# Patient Record
Sex: FEMALE | Race: WHITE | NOT HISPANIC OR LATINO | Employment: UNEMPLOYED | URBAN - NONMETROPOLITAN AREA
[De-identification: names, ages, dates, MRNs, and addresses within clinical notes are randomized per-mention and may not be internally consistent; named-entity substitution may affect disease eponyms.]

---

## 2020-02-24 ENCOUNTER — APPOINTMENT (EMERGENCY)
Dept: RADIOLOGY | Facility: HOSPITAL | Age: 75
DRG: 189 | End: 2020-02-24
Payer: MEDICARE

## 2020-02-24 ENCOUNTER — OFFICE VISIT (OUTPATIENT)
Dept: URGENT CARE | Facility: CLINIC | Age: 75
End: 2020-02-24
Payer: MEDICARE

## 2020-02-24 ENCOUNTER — HOSPITAL ENCOUNTER (INPATIENT)
Facility: HOSPITAL | Age: 75
LOS: 4 days | Discharge: LEFT BEFORE DISCHARGE | DRG: 189 | End: 2020-02-28
Attending: EMERGENCY MEDICINE | Admitting: FAMILY MEDICINE
Payer: MEDICARE

## 2020-02-24 VITALS
OXYGEN SATURATION: 83 % | RESPIRATION RATE: 30 BRPM | SYSTOLIC BLOOD PRESSURE: 191 MMHG | HEART RATE: 82 BPM | HEIGHT: 63 IN | BODY MASS INDEX: 51.91 KG/M2 | WEIGHT: 293 LBS | TEMPERATURE: 99.8 F | DIASTOLIC BLOOD PRESSURE: 86 MMHG

## 2020-02-24 DIAGNOSIS — R06.00 DYSPNEA, UNSPECIFIED TYPE: ICD-10-CM

## 2020-02-24 DIAGNOSIS — Z85.3 HISTORY OF BREAST CANCER: ICD-10-CM

## 2020-02-24 DIAGNOSIS — J06.9 VIRAL URI WITH COUGH: Primary | ICD-10-CM

## 2020-02-24 DIAGNOSIS — J06.9 VIRAL URI WITH COUGH: ICD-10-CM

## 2020-02-24 DIAGNOSIS — J18.9 PNEUMONIA OF RIGHT MIDDLE LOBE DUE TO INFECTIOUS ORGANISM: ICD-10-CM

## 2020-02-24 DIAGNOSIS — J10.1 INFLUENZA A: ICD-10-CM

## 2020-02-24 DIAGNOSIS — R09.02 HYPOXIA: ICD-10-CM

## 2020-02-24 DIAGNOSIS — J44.1 COPD EXACERBATION (HCC): Primary | ICD-10-CM

## 2020-02-24 PROBLEM — I10 ESSENTIAL HYPERTENSION: Status: ACTIVE | Noted: 2020-02-24

## 2020-02-24 PROBLEM — E11.65 TYPE 2 DIABETES MELLITUS WITH HYPERGLYCEMIA, WITHOUT LONG-TERM CURRENT USE OF INSULIN (HCC): Status: ACTIVE | Noted: 2020-02-24

## 2020-02-24 PROBLEM — E03.9 ACQUIRED HYPOTHYROIDISM: Status: ACTIVE | Noted: 2020-02-24

## 2020-02-24 PROBLEM — R91.8 PULMONARY INFILTRATE IN RIGHT LUNG ON CXR: Status: ACTIVE | Noted: 2020-02-24

## 2020-02-24 LAB
ALBUMIN SERPL BCP-MCNC: 3.1 G/DL (ref 3.5–5)
ALP SERPL-CCNC: 68 U/L (ref 46–116)
ALT SERPL W P-5'-P-CCNC: 26 U/L (ref 12–78)
ANION GAP SERPL CALCULATED.3IONS-SCNC: 3 MMOL/L (ref 4–13)
AST SERPL W P-5'-P-CCNC: 32 U/L (ref 5–45)
BASOPHILS # BLD AUTO: 0.01 THOUSANDS/ΜL (ref 0–0.1)
BASOPHILS NFR BLD AUTO: 0 % (ref 0–1)
BILIRUB SERPL-MCNC: 0.43 MG/DL (ref 0.2–1)
BUN SERPL-MCNC: 9 MG/DL (ref 5–25)
CALCIUM SERPL-MCNC: 8.2 MG/DL (ref 8.3–10.1)
CHLORIDE SERPL-SCNC: 102 MMOL/L (ref 100–108)
CO2 SERPL-SCNC: 35 MMOL/L (ref 21–32)
CREAT SERPL-MCNC: 0.67 MG/DL (ref 0.6–1.3)
EOSINOPHIL # BLD AUTO: 0.02 THOUSAND/ΜL (ref 0–0.61)
EOSINOPHIL NFR BLD AUTO: 1 % (ref 0–6)
ERYTHROCYTE [DISTWIDTH] IN BLOOD BY AUTOMATED COUNT: 13.5 % (ref 11.6–15.1)
FLUAV RNA NPH QL NAA+PROBE: DETECTED
FLUBV RNA NPH QL NAA+PROBE: ABNORMAL
GFR SERPL CREATININE-BSD FRML MDRD: 87 ML/MIN/1.73SQ M
GLUCOSE SERPL-MCNC: 247 MG/DL (ref 65–140)
HCT VFR BLD AUTO: 40.5 % (ref 34.8–46.1)
HGB BLD-MCNC: 13.2 G/DL (ref 11.5–15.4)
IMM GRANULOCYTES # BLD AUTO: 0.02 THOUSAND/UL (ref 0–0.2)
IMM GRANULOCYTES NFR BLD AUTO: 1 % (ref 0–2)
LYMPHOCYTES # BLD AUTO: 0.65 THOUSANDS/ΜL (ref 0.6–4.47)
LYMPHOCYTES NFR BLD AUTO: 17 % (ref 14–44)
MCH RBC QN AUTO: 32.3 PG (ref 26.8–34.3)
MCHC RBC AUTO-ENTMCNC: 32.6 G/DL (ref 31.4–37.4)
MCV RBC AUTO: 99 FL (ref 82–98)
MONOCYTES # BLD AUTO: 0.45 THOUSAND/ΜL (ref 0.17–1.22)
MONOCYTES NFR BLD AUTO: 12 % (ref 4–12)
NEUTROPHILS # BLD AUTO: 2.69 THOUSANDS/ΜL (ref 1.85–7.62)
NEUTS SEG NFR BLD AUTO: 69 % (ref 43–75)
NRBC BLD AUTO-RTO: 0 /100 WBCS
NT-PROBNP SERPL-MCNC: 392 PG/ML
PLATELET # BLD AUTO: 80 THOUSANDS/UL (ref 149–390)
PMV BLD AUTO: 9.2 FL (ref 8.9–12.7)
POTASSIUM SERPL-SCNC: 4 MMOL/L (ref 3.5–5.3)
PROT SERPL-MCNC: 7 G/DL (ref 6.4–8.2)
RBC # BLD AUTO: 4.09 MILLION/UL (ref 3.81–5.12)
RSV RNA NPH QL NAA+PROBE: ABNORMAL
SODIUM SERPL-SCNC: 140 MMOL/L (ref 136–145)
TROPONIN I SERPL-MCNC: <0.02 NG/ML
WBC # BLD AUTO: 3.84 THOUSAND/UL (ref 4.31–10.16)

## 2020-02-24 PROCEDURE — 71045 X-RAY EXAM CHEST 1 VIEW: CPT

## 2020-02-24 PROCEDURE — NC001 PR NO CHARGE: Performed by: NURSE PRACTITIONER

## 2020-02-24 PROCEDURE — 93005 ELECTROCARDIOGRAM TRACING: CPT

## 2020-02-24 PROCEDURE — G0463 HOSPITAL OUTPT CLINIC VISIT: HCPCS | Performed by: EMERGENCY MEDICINE

## 2020-02-24 PROCEDURE — 94760 N-INVAS EAR/PLS OXIMETRY 1: CPT

## 2020-02-24 PROCEDURE — 94660 CPAP INITIATION&MGMT: CPT

## 2020-02-24 PROCEDURE — 80053 COMPREHEN METABOLIC PANEL: CPT | Performed by: EMERGENCY MEDICINE

## 2020-02-24 PROCEDURE — 85025 COMPLETE CBC W/AUTO DIFF WBC: CPT | Performed by: EMERGENCY MEDICINE

## 2020-02-24 PROCEDURE — 96367 TX/PROPH/DG ADDL SEQ IV INF: CPT

## 2020-02-24 PROCEDURE — 96375 TX/PRO/DX INJ NEW DRUG ADDON: CPT

## 2020-02-24 PROCEDURE — 94640 AIRWAY INHALATION TREATMENT: CPT

## 2020-02-24 PROCEDURE — 99291 CRITICAL CARE FIRST HOUR: CPT | Performed by: EMERGENCY MEDICINE

## 2020-02-24 PROCEDURE — 87631 RESP VIRUS 3-5 TARGETS: CPT | Performed by: EMERGENCY MEDICINE

## 2020-02-24 PROCEDURE — 83880 ASSAY OF NATRIURETIC PEPTIDE: CPT | Performed by: EMERGENCY MEDICINE

## 2020-02-24 PROCEDURE — 36415 COLL VENOUS BLD VENIPUNCTURE: CPT | Performed by: EMERGENCY MEDICINE

## 2020-02-24 PROCEDURE — 99285 EMERGENCY DEPT VISIT HI MDM: CPT

## 2020-02-24 PROCEDURE — 96365 THER/PROPH/DIAG IV INF INIT: CPT

## 2020-02-24 PROCEDURE — 99223 1ST HOSP IP/OBS HIGH 75: CPT | Performed by: NURSE PRACTITIONER

## 2020-02-24 PROCEDURE — 84484 ASSAY OF TROPONIN QUANT: CPT | Performed by: EMERGENCY MEDICINE

## 2020-02-24 PROCEDURE — 99203 OFFICE O/P NEW LOW 30 MIN: CPT | Performed by: EMERGENCY MEDICINE

## 2020-02-24 RX ORDER — CELECOXIB 50 MG/1
CAPSULE ORAL
COMMUNITY
Start: 2019-12-04 | End: 2020-02-28 | Stop reason: HOSPADM

## 2020-02-24 RX ORDER — ALBUTEROL SULFATE 2.5 MG/3ML
2.5 SOLUTION RESPIRATORY (INHALATION) EVERY 4 HOURS PRN
Status: DISCONTINUED | OUTPATIENT
Start: 2020-02-24 | End: 2020-02-28 | Stop reason: HOSPADM

## 2020-02-24 RX ORDER — ASPIRIN 81 MG/1
81 TABLET ORAL DAILY
COMMUNITY

## 2020-02-24 RX ORDER — ALBUTEROL SULFATE 2.5 MG/3ML
2.5 SOLUTION RESPIRATORY (INHALATION) ONCE
Status: COMPLETED | OUTPATIENT
Start: 2020-02-24 | End: 2020-02-24

## 2020-02-24 RX ORDER — CEFTRIAXONE 1 G/50ML
1000 INJECTION, SOLUTION INTRAVENOUS EVERY 24 HOURS
Status: DISCONTINUED | OUTPATIENT
Start: 2020-02-25 | End: 2020-02-26

## 2020-02-24 RX ORDER — CELECOXIB 200 MG/1
CAPSULE ORAL
COMMUNITY
Start: 2020-01-07 | End: 2020-02-28 | Stop reason: HOSPADM

## 2020-02-24 RX ORDER — ATENOLOL 50 MG/1
TABLET ORAL
COMMUNITY
Start: 2020-01-07

## 2020-02-24 RX ORDER — LISINOPRIL 20 MG/1
TABLET ORAL
COMMUNITY
Start: 2020-02-20

## 2020-02-24 RX ORDER — METHYLPREDNISOLONE SODIUM SUCCINATE 125 MG/2ML
125 INJECTION, POWDER, LYOPHILIZED, FOR SOLUTION INTRAMUSCULAR; INTRAVENOUS ONCE
Status: COMPLETED | OUTPATIENT
Start: 2020-02-24 | End: 2020-02-24

## 2020-02-24 RX ORDER — GLIPIZIDE 5 MG/1
TABLET, FILM COATED, EXTENDED RELEASE ORAL
COMMUNITY
Start: 2020-02-20

## 2020-02-24 RX ORDER — OSELTAMIVIR PHOSPHATE 75 MG/1
75 CAPSULE ORAL ONCE
Status: COMPLETED | OUTPATIENT
Start: 2020-02-24 | End: 2020-02-24

## 2020-02-24 RX ORDER — GUAIFENESIN 600 MG
600 TABLET, EXTENDED RELEASE 12 HR ORAL 2 TIMES DAILY
Status: DISCONTINUED | OUTPATIENT
Start: 2020-02-25 | End: 2020-02-28 | Stop reason: HOSPADM

## 2020-02-24 RX ORDER — GLIPIZIDE 2.5 MG/1
TABLET, EXTENDED RELEASE ORAL
COMMUNITY
Start: 2020-02-20

## 2020-02-24 RX ORDER — ALBUTEROL SULFATE 2.5 MG/3ML
2.5 SOLUTION RESPIRATORY (INHALATION) ONCE
Status: DISCONTINUED | OUTPATIENT
Start: 2020-02-24 | End: 2020-02-24 | Stop reason: HOSPADM

## 2020-02-24 RX ORDER — KETOROLAC TROMETHAMINE 5 MG/ML
SOLUTION OPHTHALMIC
COMMUNITY
Start: 2020-02-20

## 2020-02-24 RX ORDER — ALBUTEROL SULFATE 90 UG/1
2 AEROSOL, METERED RESPIRATORY (INHALATION) EVERY 6 HOURS PRN
COMMUNITY
End: 2020-02-24

## 2020-02-24 RX ORDER — PREDNISOLONE ACETATE 10 MG/ML
SUSPENSION/ DROPS OPHTHALMIC
COMMUNITY
Start: 2020-02-22

## 2020-02-24 RX ORDER — OFLOXACIN 3 MG/ML
SOLUTION/ DROPS OPHTHALMIC
COMMUNITY
Start: 2020-02-22 | End: 2020-02-28 | Stop reason: HOSPADM

## 2020-02-24 RX ORDER — METHYLPREDNISOLONE SODIUM SUCCINATE 40 MG/ML
40 INJECTION, POWDER, LYOPHILIZED, FOR SOLUTION INTRAMUSCULAR; INTRAVENOUS EVERY 8 HOURS SCHEDULED
Status: DISCONTINUED | OUTPATIENT
Start: 2020-02-24 | End: 2020-02-27

## 2020-02-24 RX ORDER — LEVOTHYROXINE SODIUM 0.15 MG/1
TABLET ORAL
COMMUNITY
Start: 2020-02-20

## 2020-02-24 RX ORDER — CEFTRIAXONE 1 G/50ML
1000 INJECTION, SOLUTION INTRAVENOUS ONCE
Status: COMPLETED | OUTPATIENT
Start: 2020-02-24 | End: 2020-02-24

## 2020-02-24 RX ADMIN — CEFTRIAXONE 1000 MG: 1 INJECTION, SOLUTION INTRAVENOUS at 19:53

## 2020-02-24 RX ADMIN — AZITHROMYCIN MONOHYDRATE 500 MG: 500 INJECTION, POWDER, LYOPHILIZED, FOR SOLUTION INTRAVENOUS at 20:21

## 2020-02-24 RX ADMIN — ALBUTEROL SULFATE 2.5 MG: 2.5 SOLUTION RESPIRATORY (INHALATION) at 12:07

## 2020-02-24 RX ADMIN — Medication 0.5 MG: at 12:07

## 2020-02-24 RX ADMIN — ALBUTEROL SULFATE 2.5 MG: 2.5 SOLUTION RESPIRATORY (INHALATION) at 18:32

## 2020-02-24 RX ADMIN — ALBUTEROL SULFATE 2.5 MG: 2.5 SOLUTION RESPIRATORY (INHALATION) at 23:13

## 2020-02-24 RX ADMIN — METHYLPREDNISOLONE SODIUM SUCCINATE 125 MG: 125 INJECTION, POWDER, FOR SOLUTION INTRAMUSCULAR; INTRAVENOUS at 18:37

## 2020-02-24 RX ADMIN — OSELTAMIVIR PHOSPHATE 75 MG: 75 CAPSULE ORAL at 19:53

## 2020-02-24 NOTE — ED PROVIDER NOTES
History  Chief Complaint   Patient presents with    Shortness of Breath     pt states SOB started friday with fever friday and saturday  pt states she was at urgent care earlier today and dx with URI  pt denies cp  pt pulse ox 70 % on room air at time of triage     Patient is a 78-year-old female with a history of COPD who still smokes cigarettes, presenting to the emergency department complaining of 3-4 day history of shortness of breath with productive cough and fever, was seen at urgent care earlier today and diagnosed with viral URI, was encouraged to come to the emergency department for evaluation, initially patient resisted but as her shortness of breath continued she decided to come to the emergency department, she is noted to be 70% on room air at time of triage          Prior to Admission Medications   Prescriptions Last Dose Informant Patient Reported? Taking?    albuterol (5 mg/mL) 0 5 % nebulizer solution   Yes No   Sig: Take 2 5 mg by nebulization every 6 (six) hours as needed for wheezing   aspirin (ECOTRIN LOW STRENGTH) 81 mg EC tablet   Yes No   Sig: Take 81 mg by mouth daily   atenolol (TENORMIN) 50 mg tablet   Yes No   celecoxib (CeleBREX) 200 mg capsule   Yes No   celecoxib (CeleBREX) 50 MG capsule   Yes No   glipiZIDE (GLUCOTROL XL) 2 5 mg 24 hr tablet   Yes No   glipiZIDE (GLUCOTROL XL) 5 mg 24 hr tablet   Yes No   ketorolac (ACULAR) 0 5 % ophthalmic solution   Yes No   levothyroxine 150 mcg tablet   Yes No   lisinopril (ZESTRIL) 20 mg tablet   Yes No   nystatin-triamcinolone (MYCOLOG-II) cream   Yes No   ofloxacin (OCUFLOX) 0 3 % ophthalmic solution   Yes No   prednisoLONE acetate (PRED FORTE) 1 % ophthalmic suspension   Yes No      Facility-Administered Medications Last Administration Doses Remaining   albuterol inhalation solution 2 5 mg 2/24/2020 12:07 PM 0   ipratropium (ATROVENT) 0 02 % inhalation solution 0 5 mg 2/24/2020 12:07 PM 0          Past Medical History:   Diagnosis Date    Breast cancer ([facility] )     Diabetes mellitus ([facility] )     Hypertension        Past Surgical History:   Procedure Laterality Date    BREAST SURGERY      left lumpectomy    CARPAL TUNNEL RELEASE      CATARACT EXTRACTION      JOINT REPLACEMENT         History reviewed  No pertinent family history  I have reviewed and agree with the history as documented  E-Cigarette/Vaping    E-Cigarette Use Never User      E-Cigarette/Vaping Substances     Social History     Tobacco Use    Smoking status: Current Every Day Smoker     Packs/day: 0 25     Types: Cigarettes    Smokeless tobacco: Never Used   Substance Use Topics    Alcohol use: Never     Frequency: Never    Drug use: Never       Review of Systems   Constitutional: Positive for fever  HENT: Positive for congestion  Eyes: Negative  Respiratory: Positive for cough and shortness of breath  Cardiovascular: Negative  Gastrointestinal: Negative  Endocrine: Negative  Genitourinary: Negative  Musculoskeletal: Negative  Skin: Negative  Allergic/Immunologic: Negative  Neurological: Negative  Hematological: Negative  Psychiatric/Behavioral: Negative  Physical Exam  Physical Exam   Constitutional: She is oriented to person, place, and time  She appears well-developed  She appears ill  HENT:   Head: Normocephalic and atraumatic  Eyes: Pupils are equal, round, and reactive to light  EOM are normal    Neck: Normal range of motion  Cardiovascular: Normal rate  Pulmonary/Chest: Tachypnea noted  She has wheezes  She has rhonchi  Abdominal: Soft  Obese   Musculoskeletal: Normal range of motion  She exhibits edema  Neurological: She is alert and oriented to person, place, and time  Skin: Skin is warm and dry  Psychiatric: She has a normal mood and affect         Vital Signs  ED Triage Vitals [02/24/20 1817]   Temperature Pulse Respirations Blood Pressure SpO2   97 7 °F (36 5 °C) 72 (!) 30 (!) 197/89 (!) 89 %      Temp Source Heart Rate Source Patient Position - Orthostatic VS BP Location FiO2 (%)   Temporal Monitor Sitting Right arm --      Pain Score       No Pain           Vitals:    02/24/20 1817 02/24/20 2023 02/24/20 2100   BP: (!) 197/89 159/76 156/78   Pulse: 72 74 92   Patient Position - Orthostatic VS: Sitting Sitting Sitting               ED Medications  Medications   albuterol inhalation solution 2 5 mg (2 5 mg Nebulization Given 2/24/20 1832)   methylPREDNISolone sodium succinate (Solu-MEDROL) injection 125 mg (125 mg Intravenous Given 2/24/20 1837)   oseltamivir (TAMIFLU) capsule 75 mg (75 mg Oral Given 2/24/20 1953)   cefTRIAXone (ROCEPHIN) IVPB (premix) 1,000 mg (0 mg Intravenous Stopped 2/24/20 2023)   azithromycin (ZITHROMAX) 500 mg in sodium chloride 0 9 % 250 mL IVPB (0 mg Intravenous Stopped 2/24/20 2124)       Diagnostic Studies  Results Reviewed     Procedure Component Value Units Date/Time    CBC and differential [[id number]]  (Abnormal) Collected:  02/24/20 1835    Lab Status:  Final result Specimen:  Blood from Arm, Right Updated:  02/24/20 1921     WBC 3 84 Thousand/uL      RBC 4 09 Million/uL      Hemoglobin 13 2 g/dL      Hematocrit 40 5 %      MCV 99 fL      MCH 32 3 pg      MCHC 32 6 g/dL      RDW 13 5 %      MPV 9 2 fL      Platelets 80 Thousands/uL      nRBC 0 /100 WBCs      Neutrophils Relative 69 %      Immat GRANS % 1 %      Lymphocytes Relative 17 %      Monocytes Relative 12 %      Eosinophils Relative 1 %      Basophils Relative 0 %      Neutrophils Absolute 2 69 Thousands/µL      Immature Grans Absolute 0 02 Thousand/uL      Lymphocytes Absolute 0 65 Thousands/µL      Monocytes Absolute 0 45 Thousand/µL      Eosinophils Absolute 0 02 Thousand/µL      Basophils Absolute 0 01 Thousands/µL     Influenza A/B and RSV PCR [[id number]]  (Abnormal) Collected:  02/24/20 1835    Lab Status:  Final result Specimen:  Nose Updated:  02/24/20 1918     INFLUENZA A PCR Detected     INFLUENZA B PCR None Detected     RSV PCR None Detected    Comprehensive metabolic panel [[id number]]  (Abnormal) Collected:  02/24/20 1835    Lab Status:  Final result Specimen:  Blood from Arm, Right Updated:  02/24/20 1902     Sodium 140 mmol/L      Potassium 4 0 mmol/L      Chloride 102 mmol/L      CO2 35 mmol/L      ANION GAP 3 mmol/L      BUN 9 mg/dL      Creatinine 0 67 mg/dL      Glucose 247 mg/dL      Calcium 8 2 mg/dL      AST 32 U/L      ALT 26 U/L      Alkaline Phosphatase 68 U/L      Total Protein 7 0 g/dL      Albumin 3 1 g/dL      Total Bilirubin 0 43 mg/dL      eGFR 87 ml/min/1 73sq m     Narrative:       [place] guidelines for Chronic Kidney Disease (CKD):     Stage 1 with normal or high GFR (GFR > 90 mL/min/1 73 square meters)    Stage 2 Mild CKD (GFR = 60-89 mL/min/1 73 square meters)    Stage 3A Moderate CKD (GFR = 45-59 mL/min/1 73 square meters)    Stage 3B Moderate CKD (GFR = 30-44 mL/min/1 73 square meters)    Stage 4 Severe CKD (GFR = 15-29 mL/min/1 73 square meters)    Stage 5 End Stage CKD (GFR <15 mL/min/1 73 square meters)  Note: GFR calculation is accurate only with a steady state creatinine    NT-BNP PRO [[id number]]  (Abnormal) Collected:  02/24/20 1835    Lab Status:  Final result Specimen:  Blood from Arm, Right Updated:  02/24/20 1902     NT-proBNP 392 pg/mL     Troponin I [[id number]]  (Normal) Collected:  02/24/20 1835    Lab Status:  Final result Specimen:  Blood from Arm, Right Updated:  02/24/20 1859     Troponin I <0 02 ng/mL                  XR chest 1 view portable   ED Interpretation by Carlene Mario DO (02/24 1851)   Right perihilar consolidation                 Procedures  ECG 12 Lead Documentation Only  Date/Time: 2/24/2020 6:56 PM  Performed by: Carlene Mario DO  Authorized by: Carlene Mario DO     Indications / Diagnosis:  Shortness of breath  ECG reviewed by me, the ED Provider: yes    Patient location:  ED  Previous ECG:     Comparison to cardiac monitor: Yes    Interpretation:     Interpretation: normal    Rate:     ECG rate:  71    ECG rate assessment: normal    Rhythm:     Rhythm: sinus rhythm    Ectopy:     Ectopy: none    QRS:     QRS axis:  Normal    QRS intervals:  Normal  Conduction:     Conduction: normal    ST segments:     ST segments:  Normal  T waves:     T waves: normal      CriticalCare Time  Performed by: Prabha Valdes DO  Authorized by: Prabha Valdes DO     Critical care provider statement:     Critical care time (minutes):  40    Critical care was time spent personally by me on the following activities:  Obtaining history from patient or surrogate, development of treatment plan with patient or surrogate, evaluation of patient's response to treatment, examination of patient, ordering and performing treatments and interventions, ordering and review of laboratory studies, ordering and review of radiographic studies, re-evaluation of patient's condition and review of old charts    I assumed direction of critical care for this patient from another provider in my specialty: no               ED Course  ED Course as of Feb 24 2157   Mon Feb 24, 2020 2155 Lab and imaging findings consistent with influenza a, with right perihilar infiltrate, patient initially hypoxic on arrival, responded well to 2 L nasal cannula and albuterol treatments, she does get slightly hypoxic and short of breath when she attempts to ambulated all, therefore patient agreeable to admission to the hospital for further evaluation and treatment, discussed with hospitalist service who is in agreement as well                                      Disposition  Final diagnoses:   COPD exacerbation ([facility] )   Hypoxia   Pneumonia of right middle lobe due to infectious organism [facility])   Influenza A     Time reflects when diagnosis was documented in both MDM as applicable and the Disposition within this note     Time User Action Codes Description Comment    2/24/2020  8:52 PM Lesta American Add [J44 1] COPD exacerbation ([facility] )     2/24/2020  8:52 PM Lesta American Add [R09 02] Hypoxia     2/24/2020  8:52 PM JasminGaetano garcia Add [J18 1] Pneumonia of right middle lobe due to infectious organism ([facility] )     2/24/2020  8:52 PM Gaetano Redd Add [J10 1] Influenza A       ED Disposition     ED Disposition Condition Date/Time Comment    Admit Stable Mon Feb 24, 2020  8:52 PM Case was discussed with NP Radha Anaya and the patient's admission status was agreed to be Admission Status: inpatient status to the service of Dr Kathie Gunn          Follow-up Information    None         Current Discharge Medication List      CONTINUE these medications which have NOT CHANGED    Details   albuterol (5 mg/mL) 0 5 % nebulizer solution Take 2 5 mg by nebulization every 6 (six) hours as needed for wheezing      aspirin (ECOTRIN LOW STRENGTH) 81 mg EC tablet Take 81 mg by mouth daily      atenolol (TENORMIN) 50 mg tablet       !! celecoxib (CeleBREX) 200 mg capsule       !! celecoxib (CeleBREX) 50 MG capsule       !! glipiZIDE (GLUCOTROL XL) 2 5 mg 24 hr tablet       !! glipiZIDE (GLUCOTROL XL) 5 mg 24 hr tablet       ketorolac (ACULAR) 0 5 % ophthalmic solution       levothyroxine 150 mcg tablet       lisinopril (ZESTRIL) 20 mg tablet       nystatin-triamcinolone (MYCOLOG-II) cream       ofloxacin (OCUFLOX) 0 3 % ophthalmic solution       prednisoLONE acetate (PRED FORTE) 1 % ophthalmic suspension        !! - Potential duplicate medications found  Please discuss with provider  No discharge procedures on file      PDMP Review     None          ED Provider  Electronically Signed by           Maureen Martinez DO  02/24/20 0415

## 2020-02-24 NOTE — PROGRESS NOTES
[id number]        NAME: Ok Gomez is a 69 y o  female  : 1945    MRN: [id number]  DATE: 2020  TIME: 12:49 PM    Assessment and Plan   Viral URI with cough [J06 9, B97 [id number]]  1  Viral URI with cough  albuterol inhalation solution 2 5 mg    ipratropium (ATROVENT) 0 02 % inhalation solution 0 5 mg    Peak flow    Ambulatory Referral to Emergency Medicine    Transfer to other facility   2  Dyspnea, unspecified type  Ambulatory Referral to Emergency Medicine    Transfer to other facility     Pre treatment peak flow was 140  Post treatment peak flow is 100  Pulse ox post treatment remains at 83%  I advised patient that she would need to go to the ER for further evaluation to likely include further neb treatments and steroids  I advised her that she should go to the ER by ambulance  Patient absolutely refuses to go to ER as he claims she felt better after neb treatment  She has nebulizer medicine at the pharmacy that have been called in earlier today by her primary care doctor  She states that she plans to  the medicine and use the neb treatments as prescribed and if she does not respond then she will agree to go to the [facility] ER  Patient Instructions     Patient Instructions     You have been diagnosed with a Viral Upper Respiratory infection and your symptoms should resolve over the next 7 to 10 days with the treatments recommended today  If they do not, it is possible that you have developed a bacterial infection and you should return  If you were to take an antibiotic while you are still in the viral stage, you will not get better any faster, but could kill off the good germs in your body as well as make the germs in you resistant to the antibiotic  Take an expectorant - guaifenesin should be the only ingredient - during the day, and the cough suppressant (ex  Robitussin DM or Tessalon) if needed at night only   Take Zinc 12 5 to 15 mg every 2 - 3 hrs while awake for the next few days  You may take Cold Alessandro (13 3 mg of Zinc) or split a 25 mg Zinc tablet or lozenge in two or a 50 mg into four to get the proper dose  The total daily dose of Zinc should exceed 75 mg per day  You may also take a decongestant like Sudafed, unless you have hypertension or cardiac disease  Hold any NSAIDs like Ibuprofen (Advil), Naprosyn (Aleve), etc while on steroids like Medrol or Prednisone  If you are diabetic, you should also adhere strictly to your diet and monitor your blood sugar closely while on the steroids as discussed  Discontinue the steroid immediately if your blood sugar gets out of control  Upper Respiratory Infection   AMBULATORY CARE:   An upper respiratory infection  is also called a common cold  It can affect your nose, throat, ears, and sinuses  Common signs and symptoms include the following:  Cold symptoms are usually worst for the first 3 to 5 days  You may have any of the following:  · Runny or stuffy nose    · Sneezing and coughing    · Sore throat or hoarseness    · Red, watery, and sore eyes    · Fatigue     · Chills and fever    · Headache, body aches, or sore muscles  Seek care immediately if:   · You have chest pain or trouble breathing  Contact your healthcare provider if:   · You have a fever over 102ºF (39°C)  · Your sore throat gets worse or you see white or yellow spots in your throat  · Your symptoms get worse after 3 to 5 days or your cold is not better in 14 days  · You have a rash anywhere on your skin  · You have large, tender lumps in your neck  · You have thick, green or yellow drainage from your nose  · You cough up thick yellow, green, or bloody mucus  · You have vomiting for more than 24 hours and cannot keep fluids down  · You have a bad earache  · You have questions or concerns about your condition or care  Treatment for a cold: There is no cure for the common cold   Colds are caused by viruses and do not get better with antibiotics  Most people get better in 7 to 14 days  You may continue to cough for 2 to 3 weeks  The following may help decrease your symptoms:  · Decongestants  help reduce nasal congestion and help you breathe more easily  If you take decongestant pills, they may make you feel restless or not able to sleep  Do not use decongestant sprays for more than a few days  · Cough suppressants  help reduce coughing  Ask your healthcare provider which type of cough medicine is best for you  · NSAIDs , such as ibuprofen, help decrease swelling, pain, and fever  NSAIDs can cause stomach bleeding or kidney problems in certain people  If you take blood thinner medicine, always ask your healthcare provider if NSAIDs are safe for you  Always read the medicine label and follow directions  · Acetaminophen  decreases pain and fever  It is available without a doctor's order  Ask how much to take and how often to take it  Follow directions  Read the labels of all other medicines you are using to see if they also contain acetaminophen, or ask your doctor or pharmacist  Acetaminophen can cause liver damage if not taken correctly  Do not use more than 4 grams (4,000 milligrams) total of acetaminophen in one day  Manage your cold:   · Rest as much as possible  Slowly start to do more each day  · Drink more liquids as directed  Liquids will help thin and loosen mucus so you can cough it up  Liquids will also help prevent dehydration  Liquids that help prevent dehydration include water, fruit juice, and broth  Do not drink liquids that contain caffeine  Caffeine can increase your risk for dehydration  Ask your healthcare provider how much liquid to drink each day  · Soothe a sore throat  Gargle with warm salt water  This helps your sore throat feel better  Make salt water by dissolving ¼ teaspoon salt in 1 cup warm water  You may also suck on hard candy or throat lozenges   You may use a sore throat spray  · Use a humidifier or vaporizer  Use a cool mist humidifier or a vaporizer to increase air moisture in your home  This may make it easier for you to breathe and help decrease your cough  · Use saline nasal drops as directed  These help relieve congestion  · Apply petroleum-based jelly around the outside of your nostrils  This can decrease irritation from blowing your nose  · Do not smoke  Nicotine and other chemicals in cigarettes and cigars can make your symptoms worse  They can also cause infections such as bronchitis or pneumonia  Ask your healthcare provider for information if you currently smoke and need help to quit  E-cigarettes or smokeless tobacco still contain nicotine  Talk to your healthcare provider before you use these products  Prevent spreading your cold to others:   · Try to stay away from other people during the first 2 to 3 days of your cold when it is more easily spread  · Do not share food or drinks  · Do not share hand towels with household members  · Wash your hands often, especially after you blow your nose  Turn away from other people and cover your mouth and nose with a tissue when you sneeze or cough  Follow up with your healthcare provider as directed:  Write down your questions so you remember to ask them during your visits  © 2017 [facility] Information is for End User's use only and may not be sold, redistributed or otherwise used for commercial purposes  All illustrations and images included in CareNotes® are the copyrighted property of [employer] , [employer]  or Omid Eldridge  The above information is an  only  It is not intended as medical advice for individual conditions or treatments  Talk to your doctor, nurse or pharmacist before following any medical regimen to see if it is safe and effective for you  Dyspnea   WHAT YOU NEED TO KNOW:   Dyspnea is breathing difficulty or discomfort  You may have labored, painful, or shallow breathing  You may feel breathless or short of breath  Dyspnea can occur during rest or with activity  You may have dyspnea for a short time, or it might become chronic  Dyspnea is often a symptom of a disease or condition  DISCHARGE INSTRUCTIONS:   Return to the emergency department if:   · Your signs and symptoms are the same or worse within 24 hours of treatment  · You have shaking chills or a fever over 102°F      · You have new pain, pressure, or tightness in your chest      · You have a new or worse cough or wheezing, or you cough up blood  · You feel like you cannot get enough air  · The skin over your ribs or on your neck sinks in when you breathe  · You have a severe headache with vomiting and abdominal pain  · You feel confused or dizzy  Contact your healthcare provider or specialist if:   · You have questions or concerns about your condition or care  Medicines:   · Medicines  may be used to treat the cause of your dyspnea  Medicines may reduce swelling in your airway or decrease extra fluid from around your heart or lungs  Other medicines may be used to decrease anxiety and help you feel calm and relaxed  · Take your medicine as directed  Contact your healthcare provider if you think your medicine is not helping or if you have side effects  Tell him or her if you are allergic to any medicine  Keep a list of the medicines, vitamins, and herbs you take  Include the amounts, and when and why you take them  Bring the list or the pill bottles to follow-up visits  Carry your medicine list with you in case of an emergency  Manage long-term dyspnea:   · Create an action plan  You and your healthcare provider can work together to create a plan for how to handle episodes of dyspnea  The plan can include daily activities, treatment changes, and what to do if you have severe breathing problems  · Lean forward on your elbows when you sit    This helps your lungs expand and may make it easier to breathe  · Use pursed-lip breathing any time you feel short of breath  Breathe in through your nose and then slowly breathe out through your mouth with your lips slightly puckered  It should take you twice as long to breathe out as it did to breathe in  · Do not smoke  Nicotine and other chemicals in cigarettes and cigars can cause lung damage and make it harder to breathe  Ask your healthcare provider for information if you currently smoke and need help to quit  E-cigarettes or smokeless tobacco still contain nicotine  Talk to your healthcare provider before you use these products  · Reach or maintain a healthy weight  Your healthcare provider can help you create a safe weight loss plan if you are overweight  · Exercise as directed  Exercise can help your lungs work more easily  Exercise can also help you lose weight if needed  Try to get at least 30 minutes of exercise most days of the week  Your healthcare provider can help you create an exercise plan that is safe for you  Follow up with your healthcare provider or specialist as directed:  Write down your questions so you remember to ask them during your visits  © 2017 [address] [facility] Information is for End User's use only and may not be sold, redistributed or otherwise used for commercial purposes  All illustrations and images included in CareNotes® are the copyrighted property of [employer]  or [employer]mid Eldridge  The above information is an  only  It is not intended as medical advice for individual conditions or treatments  Talk to your doctor, nurse or pharmacist before following any medical regimen to see if it is safe and effective for you  Follow up with PCP in 3-5 days  Proceed to  ER if symptoms worsen      Chief Complaint     Chief Complaint   Patient presents with    Shortness of Breath     pt reports cough and fever that started Friday  Fever resolved but pt still coughing and very short of breath  Pt had run out of Albuterol, did call PCP for refill that she will  today  Pt noted to have purse-lipped breathing  History of Present Illness       Patient complains of cough, congestion and chest tightness for the past 3 days  She had fever initially but none today  She has a nebulizer at home but ran out of albuterol nebulizer solution 3 days ago  Review of Systems   Review of Systems   Constitutional: Negative for chills and fever  HENT: Positive for congestion, rhinorrhea, sinus pressure and sore throat  Negative for trouble swallowing and voice change  Respiratory: Positive for cough and chest tightness  Negative for shortness of breath and wheezing  Cardiovascular: Negative for chest pain  Current Medications       Current Outpatient Medications:     albuterol (5 mg/mL) 0 5 % nebulizer solution, Take 2 5 mg by nebulization every 6 (six) hours as needed for wheezing, Disp: , Rfl:     aspirin (ECOTRIN LOW STRENGTH) 81 mg EC tablet, Take 81 mg by mouth daily, Disp: , Rfl:     atenolol (TENORMIN) 50 mg tablet, , Disp: , Rfl:     celecoxib (CeleBREX) 200 mg capsule, , Disp: , Rfl:     celecoxib (CeleBREX) 50 MG capsule, , Disp: , Rfl:     glipiZIDE (GLUCOTROL XL) 2 5 mg 24 hr tablet, , Disp: , Rfl:     glipiZIDE (GLUCOTROL XL) 5 mg 24 hr tablet, , Disp: , Rfl:     ketorolac (ACULAR) 0 5 % ophthalmic solution, , Disp: , Rfl:     levothyroxine 150 mcg tablet, , Disp: , Rfl:     lisinopril (ZESTRIL) 20 mg tablet, , Disp: , Rfl:     nystatin-triamcinolone (MYCOLOG-II) cream, , Disp: , Rfl:     ofloxacin (OCUFLOX) 0 3 % ophthalmic solution, , Disp: , Rfl:     prednisoLONE acetate (PRED FORTE) 1 % ophthalmic suspension, , Disp: , Rfl:   No current facility-administered medications for this visit       Current Allergies     Allergies as of 02/24/2020 - Reviewed 02/24/2020   Allergen Reaction Noted  Adhesive [medical tape]  02/24/2020    Biaxin [clarithromycin]  02/24/2020    Citrus  02/24/2020            The following portions of the patient's history were reviewed and updated as appropriate: allergies, current medications, past family history, past medical history, past social history, past surgical history and problem list      Past Medical History:   Diagnosis Date    Diabetes mellitus ([facility] [address] )     Hypertension        Past Surgical History:   Procedure Laterality Date    BREAST SURGERY      left lumpectomy    CARPAL TUNNEL RELEASE      CATARACT EXTRACTION         History reviewed  No pertinent family history  Medications have been verified  Objective   BP (!) 191/86   Pulse 82   Temp 99 8 °F (37 7 °C)   Resp (!) 30   Ht 5' 3" (1 6 m)   Wt 134 kg (296 lb)   SpO2 (!) 83%    L/min   BMI 52 43 kg/m²        Physical Exam     Physical Exam   Constitutional: She is oriented to person, place, and time  She appears well-developed and well-nourished  No distress  HENT:   Head: Normocephalic and atraumatic  Right Ear: Tympanic membrane and external ear normal    Left Ear: Tympanic membrane and external ear normal    Nose: Mucosal edema present  Mouth/Throat: Posterior oropharyngeal erythema present  No oropharyngeal exudate or tonsillar abscesses  Nasal mucosa congested, oropharynx mildly erythematous  Neck: Neck supple  Cardiovascular: Normal rate and regular rhythm  Pulmonary/Chest: Effort normal  No respiratory distress  She has no wheezes  She has no rales  Neurological: She is alert and oriented to person, place, and time  Skin: Skin is warm and dry  Psychiatric: She has a normal mood and affect  Her behavior is normal  Judgment and thought content normal    Nursing note and vitals reviewed

## 2020-02-24 NOTE — PATIENT INSTRUCTIONS
You have been diagnosed with a Viral Upper Respiratory infection and your symptoms should resolve over the next 7 to 10 days with the treatments recommended today  If they do not, it is possible that you have developed a bacterial infection and you should return  If you were to take an antibiotic while you are still in the viral stage, you will not get better any faster, but could kill off the good germs in your body as well as make the germs in you resistant to the antibiotic  Take an expectorant - guaifenesin should be the only ingredient - during the day, and the cough suppressant (ex  Robitussin DM or Tessalon) if needed at night only  Take Zinc 12 5 to 15 mg every 2 - 3 hrs while awake for the next few days  You may take Cold Alessandro (13 3 mg of Zinc) or split a 25 mg Zinc tablet or lozenge in two or a 50 mg into four to get the proper dose  The total daily dose of Zinc should exceed 75 mg per day  You may also take a decongestant like Sudafed, unless you have hypertension or cardiac disease  Hold any NSAIDs like Ibuprofen (Advil), Naprosyn (Aleve), etc while on steroids like Medrol or Prednisone  If you are diabetic, you should also adhere strictly to your diet and monitor your blood sugar closely while on the steroids as discussed  Discontinue the steroid immediately if your blood sugar gets out of control  Upper Respiratory Infection   AMBULATORY CARE:   An upper respiratory infection  is also called a common cold  It can affect your nose, throat, ears, and sinuses  Common signs and symptoms include the following:  Cold symptoms are usually worst for the first 3 to 5 days  You may have any of the following:  · Runny or stuffy nose    · Sneezing and coughing    · Sore throat or hoarseness    · Red, watery, and sore eyes    · Fatigue     · Chills and fever    · Headache, body aches, or sore muscles  Seek care immediately if:   · You have chest pain or trouble breathing      Contact your healthcare provider if:   · You have a fever over 102ºF (39°C)  · Your sore throat gets worse or you see white or yellow spots in your throat  · Your symptoms get worse after 3 to 5 days or your cold is not better in 14 days  · You have a rash anywhere on your skin  · You have large, tender lumps in your neck  · You have thick, green or yellow drainage from your nose  · You cough up thick yellow, green, or bloody mucus  · You have vomiting for more than 24 hours and cannot keep fluids down  · You have a bad earache  · You have questions or concerns about your condition or care  Treatment for a cold: There is no cure for the common cold  Colds are caused by viruses and do not get better with antibiotics  Most people get better in 7 to 14 days  You may continue to cough for 2 to 3 weeks  The following may help decrease your symptoms:  · Decongestants  help reduce nasal congestion and help you breathe more easily  If you take decongestant pills, they may make you feel restless or not able to sleep  Do not use decongestant sprays for more than a few days  · Cough suppressants  help reduce coughing  Ask your healthcare provider which type of cough medicine is best for you  · NSAIDs , such as ibuprofen, help decrease swelling, pain, and fever  NSAIDs can cause stomach bleeding or kidney problems in certain people  If you take blood thinner medicine, always ask your healthcare provider if NSAIDs are safe for you  Always read the medicine label and follow directions  · Acetaminophen  decreases pain and fever  It is available without a doctor's order  Ask how much to take and how often to take it  Follow directions  Read the labels of all other medicines you are using to see if they also contain acetaminophen, or ask your doctor or pharmacist  Acetaminophen can cause liver damage if not taken correctly  Do not use more than 4 grams (4,000 milligrams) total of acetaminophen in one day    Manage your cold:   · Rest as much as possible  Slowly start to do more each day  · Drink more liquids as directed  Liquids will help thin and loosen mucus so you can cough it up  Liquids will also help prevent dehydration  Liquids that help prevent dehydration include water, fruit juice, and broth  Do not drink liquids that contain caffeine  Caffeine can increase your risk for dehydration  Ask your healthcare provider how much liquid to drink each day  · Soothe a sore throat  Gargle with warm salt water  This helps your sore throat feel better  Make salt water by dissolving ¼ teaspoon salt in 1 cup warm water  You may also suck on hard candy or throat lozenges  You may use a sore throat spray  · Use a humidifier or vaporizer  Use a cool mist humidifier or a vaporizer to increase air moisture in your home  This may make it easier for you to breathe and help decrease your cough  · Use saline nasal drops as directed  These help relieve congestion  · Apply petroleum-based jelly around the outside of your nostrils  This can decrease irritation from blowing your nose  · Do not smoke  Nicotine and other chemicals in cigarettes and cigars can make your symptoms worse  They can also cause infections such as bronchitis or pneumonia  Ask your healthcare provider for information if you currently smoke and need help to quit  E-cigarettes or smokeless tobacco still contain nicotine  Talk to your healthcare provider before you use these products  Prevent spreading your cold to others:   · Try to stay away from other people during the first 2 to 3 days of your cold when it is more easily spread  · Do not share food or drinks  · Do not share hand towels with household members  · Wash your hands often, especially after you blow your nose  Turn away from other people and cover your mouth and nose with a tissue when you sneeze or cough         Follow up with your healthcare provider as directed:  Write down your questions so you remember to ask them during your visits  © 2017 [employer] Information is for End User's use only and may not be sold, redistributed or otherwise used for commercial purposes  All illustrations and images included in CareNotes® are the copyrighted property of [employer]  or [employer]  The above information is an  only  It is not intended as medical advice for individual conditions or treatments  Talk to your doctor, nurse or pharmacist before following any medical regimen to see if it is safe and effective for you  Dyspnea   WHAT YOU NEED TO KNOW:   Dyspnea is breathing difficulty or discomfort  You may have labored, painful, or shallow breathing  You may feel breathless or short of breath  Dyspnea can occur during rest or with activity  You may have dyspnea for a short time, or it might become chronic  Dyspnea is often a symptom of a disease or condition  DISCHARGE INSTRUCTIONS:   Return to the emergency department if:   · Your signs and symptoms are the same or worse within 24 hours of treatment  · You have shaking chills or a fever over 102°F      · You have new pain, pressure, or tightness in your chest      · You have a new or worse cough or wheezing, or you cough up blood  · You feel like you cannot get enough air  · The skin over your ribs or on your neck sinks in when you breathe  · You have a severe headache with vomiting and abdominal pain  · You feel confused or dizzy  Contact your healthcare provider or specialist if:   · You have questions or concerns about your condition or care  Medicines:   · Medicines  may be used to treat the cause of your dyspnea  Medicines may reduce swelling in your airway or decrease extra fluid from around your heart or lungs  Other medicines may be used to decrease anxiety and help you feel calm and relaxed  · Take your medicine as directed    Contact your healthcare provider if you think your medicine is not helping or if you have side effects  Tell him or her if you are allergic to any medicine  Keep a list of the medicines, vitamins, and herbs you take  Include the amounts, and when and why you take them  Bring the list or the pill bottles to follow-up visits  Carry your medicine list with you in case of an emergency  Manage long-term dyspnea:   · Create an action plan  You and your healthcare provider can work together to create a plan for how to handle episodes of dyspnea  The plan can include daily activities, treatment changes, and what to do if you have severe breathing problems  · Lean forward on your elbows when you sit  This helps your lungs expand and may make it easier to breathe  · Use pursed-lip breathing any time you feel short of breath  Breathe in through your nose and then slowly breathe out through your mouth with your lips slightly puckered  It should take you twice as long to breathe out as it did to breathe in  · Do not smoke  Nicotine and other chemicals in cigarettes and cigars can cause lung damage and make it harder to breathe  Ask your healthcare provider for information if you currently smoke and need help to quit  E-cigarettes or smokeless tobacco still contain nicotine  Talk to your healthcare provider before you use these products  · Reach or maintain a healthy weight  Your healthcare provider can help you create a safe weight loss plan if you are overweight  · Exercise as directed  Exercise can help your lungs work more easily  Exercise can also help you lose weight if needed  Try to get at least 30 minutes of exercise most days of the week  Your healthcare provider can help you create an exercise plan that is safe for you  Follow up with your healthcare provider or specialist as directed:  Write down your questions so you remember to ask them during your visits    © 2017 [employer] Information is for End User's use only and may not be sold, redistributed or otherwise used for commercial purposes  All illustrations and images included in CareNotes® are the copyrighted property of [employer]  or [employer]  The above information is an  only  It is not intended as medical advice for individual conditions or treatments  Talk to your doctor, nurse or pharmacist before following any medical regimen to see if it is safe and effective for you

## 2020-02-25 PROBLEM — J44.1 COPD EXACERBATION (HCC): Status: ACTIVE | Noted: 2020-02-25

## 2020-02-25 PROBLEM — D69.6 THROMBOCYTOPENIA (HCC): Status: ACTIVE | Noted: 2020-02-25

## 2020-02-25 LAB
ANION GAP SERPL CALCULATED.3IONS-SCNC: 2 MMOL/L (ref 4–13)
BASOPHILS # BLD AUTO: 0.01 THOUSANDS/ΜL (ref 0–0.1)
BASOPHILS NFR BLD AUTO: 0 % (ref 0–1)
BUN SERPL-MCNC: 14 MG/DL (ref 5–25)
CALCIUM SERPL-MCNC: 8.2 MG/DL (ref 8.3–10.1)
CHLORIDE SERPL-SCNC: 104 MMOL/L (ref 100–108)
CO2 SERPL-SCNC: 35 MMOL/L (ref 21–32)
CREAT SERPL-MCNC: 0.76 MG/DL (ref 0.6–1.3)
EOSINOPHIL # BLD AUTO: 0 THOUSAND/ΜL (ref 0–0.61)
EOSINOPHIL NFR BLD AUTO: 0 % (ref 0–6)
ERYTHROCYTE [DISTWIDTH] IN BLOOD BY AUTOMATED COUNT: 13.5 % (ref 11.6–15.1)
EST. AVERAGE GLUCOSE BLD GHB EST-MCNC: 174 MG/DL
GFR SERPL CREATININE-BSD FRML MDRD: 78 ML/MIN/1.73SQ M
GLUCOSE SERPL-MCNC: 286 MG/DL (ref 65–140)
GLUCOSE SERPL-MCNC: 299 MG/DL (ref 65–140)
GLUCOSE SERPL-MCNC: 321 MG/DL (ref 65–140)
GLUCOSE SERPL-MCNC: 329 MG/DL (ref 65–140)
GLUCOSE SERPL-MCNC: 329 MG/DL (ref 65–140)
GLUCOSE SERPL-MCNC: 339 MG/DL (ref 65–140)
GLUCOSE SERPL-MCNC: 381 MG/DL (ref 65–140)
HBA1C MFR BLD: 7.7 %
HCT VFR BLD AUTO: 41.5 % (ref 34.8–46.1)
HGB BLD-MCNC: 13.3 G/DL (ref 11.5–15.4)
IMM GRANULOCYTES # BLD AUTO: 0.04 THOUSAND/UL (ref 0–0.2)
IMM GRANULOCYTES NFR BLD AUTO: 1 % (ref 0–2)
LYMPHOCYTES # BLD AUTO: 0.36 THOUSANDS/ΜL (ref 0.6–4.47)
LYMPHOCYTES NFR BLD AUTO: 9 % (ref 14–44)
MCH RBC QN AUTO: 32.4 PG (ref 26.8–34.3)
MCHC RBC AUTO-ENTMCNC: 32 G/DL (ref 31.4–37.4)
MCV RBC AUTO: 101 FL (ref 82–98)
MONOCYTES # BLD AUTO: 0.17 THOUSAND/ΜL (ref 0.17–1.22)
MONOCYTES NFR BLD AUTO: 4 % (ref 4–12)
NEUTROPHILS # BLD AUTO: 3.57 THOUSANDS/ΜL (ref 1.85–7.62)
NEUTS SEG NFR BLD AUTO: 86 % (ref 43–75)
NRBC BLD AUTO-RTO: 0 /100 WBCS
PLATELET # BLD AUTO: 83 THOUSANDS/UL (ref 149–390)
PLATELET # BLD AUTO: 83 THOUSANDS/UL (ref 149–390)
PMV BLD AUTO: 9.4 FL (ref 8.9–12.7)
PMV BLD AUTO: 9.8 FL (ref 8.9–12.7)
POTASSIUM SERPL-SCNC: 5.2 MMOL/L (ref 3.5–5.3)
PROCALCITONIN SERPL-MCNC: <0.05 NG/ML
PROCALCITONIN SERPL-MCNC: <0.05 NG/ML
RBC # BLD AUTO: 4.11 MILLION/UL (ref 3.81–5.12)
S PNEUM AG UR QL: NEGATIVE
SODIUM SERPL-SCNC: 141 MMOL/L (ref 136–145)
WBC # BLD AUTO: 4.15 THOUSAND/UL (ref 4.31–10.16)

## 2020-02-25 PROCEDURE — 99232 SBSQ HOSP IP/OBS MODERATE 35: CPT | Performed by: INTERNAL MEDICINE

## 2020-02-25 PROCEDURE — 94760 N-INVAS EAR/PLS OXIMETRY 1: CPT

## 2020-02-25 PROCEDURE — 87040 BLOOD CULTURE FOR BACTERIA: CPT | Performed by: NURSE PRACTITIONER

## 2020-02-25 PROCEDURE — 94640 AIRWAY INHALATION TREATMENT: CPT

## 2020-02-25 PROCEDURE — 85049 AUTOMATED PLATELET COUNT: CPT | Performed by: NURSE PRACTITIONER

## 2020-02-25 PROCEDURE — 87070 CULTURE OTHR SPECIMN AEROBIC: CPT | Performed by: NURSE PRACTITIONER

## 2020-02-25 PROCEDURE — 85025 COMPLETE CBC W/AUTO DIFF WBC: CPT | Performed by: NURSE PRACTITIONER

## 2020-02-25 PROCEDURE — 87205 SMEAR GRAM STAIN: CPT | Performed by: NURSE PRACTITIONER

## 2020-02-25 PROCEDURE — 82948 REAGENT STRIP/BLOOD GLUCOSE: CPT

## 2020-02-25 PROCEDURE — 87077 CULTURE AEROBIC IDENTIFY: CPT | Performed by: NURSE PRACTITIONER

## 2020-02-25 PROCEDURE — 80048 BASIC METABOLIC PNL TOTAL CA: CPT | Performed by: NURSE PRACTITIONER

## 2020-02-25 PROCEDURE — 83036 HEMOGLOBIN GLYCOSYLATED A1C: CPT | Performed by: NURSE PRACTITIONER

## 2020-02-25 PROCEDURE — 84145 PROCALCITONIN (PCT): CPT | Performed by: NURSE PRACTITIONER

## 2020-02-25 PROCEDURE — 87449 NOS EACH ORGANISM AG IA: CPT | Performed by: NURSE PRACTITIONER

## 2020-02-25 RX ORDER — LISINOPRIL 20 MG/1
20 TABLET ORAL DAILY
Status: DISCONTINUED | OUTPATIENT
Start: 2020-02-25 | End: 2020-02-28 | Stop reason: HOSPADM

## 2020-02-25 RX ORDER — LORAZEPAM 0.5 MG/1
0.5 TABLET ORAL ONCE
Status: COMPLETED | OUTPATIENT
Start: 2020-02-25 | End: 2020-02-25

## 2020-02-25 RX ORDER — LEVALBUTEROL 1.25 MG/.5ML
1.25 SOLUTION, CONCENTRATE RESPIRATORY (INHALATION)
Status: DISCONTINUED | OUTPATIENT
Start: 2020-02-25 | End: 2020-02-28 | Stop reason: HOSPADM

## 2020-02-25 RX ORDER — OSELTAMIVIR PHOSPHATE 75 MG/1
75 CAPSULE ORAL EVERY 12 HOURS SCHEDULED
Status: DISCONTINUED | OUTPATIENT
Start: 2020-02-25 | End: 2020-02-28 | Stop reason: HOSPADM

## 2020-02-25 RX ORDER — ASPIRIN 81 MG/1
81 TABLET ORAL DAILY
Status: DISCONTINUED | OUTPATIENT
Start: 2020-02-25 | End: 2020-02-28 | Stop reason: HOSPADM

## 2020-02-25 RX ORDER — POTASSIUM CHLORIDE 20 MEQ/1
40 TABLET, EXTENDED RELEASE ORAL ONCE
Status: COMPLETED | OUTPATIENT
Start: 2020-02-25 | End: 2020-02-25

## 2020-02-25 RX ORDER — LEVOTHYROXINE SODIUM 0.15 MG/1
150 TABLET ORAL
Status: DISCONTINUED | OUTPATIENT
Start: 2020-02-25 | End: 2020-02-28 | Stop reason: HOSPADM

## 2020-02-25 RX ORDER — ATENOLOL 50 MG/1
50 TABLET ORAL DAILY
Status: DISCONTINUED | OUTPATIENT
Start: 2020-02-25 | End: 2020-02-28 | Stop reason: HOSPADM

## 2020-02-25 RX ADMIN — INSULIN LISPRO 2 UNITS: 100 INJECTION, SOLUTION INTRAVENOUS; SUBCUTANEOUS at 13:47

## 2020-02-25 RX ADMIN — INSULIN LISPRO 10 UNITS: 100 INJECTION, SOLUTION INTRAVENOUS; SUBCUTANEOUS at 00:25

## 2020-02-25 RX ADMIN — LEVALBUTEROL HYDROCHLORIDE 1.25 MG: 1.25 SOLUTION, CONCENTRATE RESPIRATORY (INHALATION) at 19:48

## 2020-02-25 RX ADMIN — POTASSIUM CHLORIDE 40 MEQ: 1500 TABLET, EXTENDED RELEASE ORAL at 13:46

## 2020-02-25 RX ADMIN — ALBUTEROL SULFATE 2.5 MG: 2.5 SOLUTION RESPIRATORY (INHALATION) at 05:07

## 2020-02-25 RX ADMIN — INSULIN LISPRO 6 UNITS: 100 INJECTION, SOLUTION INTRAVENOUS; SUBCUTANEOUS at 22:07

## 2020-02-25 RX ADMIN — ENOXAPARIN SODIUM 40 MG: 40 INJECTION SUBCUTANEOUS at 09:11

## 2020-02-25 RX ADMIN — GUAIFENESIN 600 MG: 600 TABLET ORAL at 17:29

## 2020-02-25 RX ADMIN — IPRATROPIUM BROMIDE 0.5 MG: 0.5 SOLUTION RESPIRATORY (INHALATION) at 07:58

## 2020-02-25 RX ADMIN — LEVALBUTEROL HYDROCHLORIDE 1.25 MG: 1.25 SOLUTION, CONCENTRATE RESPIRATORY (INHALATION) at 13:44

## 2020-02-25 RX ADMIN — CEFTRIAXONE 1000 MG: 1 INJECTION, SOLUTION INTRAVENOUS at 20:12

## 2020-02-25 RX ADMIN — AZITHROMYCIN MONOHYDRATE 500 MG: 500 INJECTION, POWDER, LYOPHILIZED, FOR SOLUTION INTRAVENOUS at 20:44

## 2020-02-25 RX ADMIN — OSELTAMIVIR PHOSPHATE 75 MG: 75 CAPSULE ORAL at 22:07

## 2020-02-25 RX ADMIN — IPRATROPIUM BROMIDE 0.5 MG: 0.5 SOLUTION RESPIRATORY (INHALATION) at 13:44

## 2020-02-25 RX ADMIN — IPRATROPIUM BROMIDE 0.5 MG: 0.5 SOLUTION RESPIRATORY (INHALATION) at 19:48

## 2020-02-25 RX ADMIN — LEVALBUTEROL HYDROCHLORIDE 1.25 MG: 1.25 SOLUTION, CONCENTRATE RESPIRATORY (INHALATION) at 07:58

## 2020-02-25 RX ADMIN — LEVOTHYROXINE SODIUM 150 MCG: 150 TABLET ORAL at 06:12

## 2020-02-25 RX ADMIN — METHYLPREDNISOLONE SODIUM SUCCINATE 40 MG: 40 INJECTION, POWDER, FOR SOLUTION INTRAMUSCULAR; INTRAVENOUS at 13:46

## 2020-02-25 RX ADMIN — ASPIRIN 81 MG: 81 TABLET, COATED ORAL at 09:11

## 2020-02-25 RX ADMIN — ATENOLOL 50 MG: 50 TABLET ORAL at 09:11

## 2020-02-25 RX ADMIN — METHYLPREDNISOLONE SODIUM SUCCINATE 40 MG: 40 INJECTION, POWDER, FOR SOLUTION INTRAMUSCULAR; INTRAVENOUS at 06:12

## 2020-02-25 RX ADMIN — METHYLPREDNISOLONE SODIUM SUCCINATE 40 MG: 40 INJECTION, POWDER, FOR SOLUTION INTRAMUSCULAR; INTRAVENOUS at 22:08

## 2020-02-25 RX ADMIN — LISINOPRIL 20 MG: 20 TABLET ORAL at 09:11

## 2020-02-25 RX ADMIN — OSELTAMIVIR PHOSPHATE 75 MG: 75 CAPSULE ORAL at 09:11

## 2020-02-25 RX ADMIN — LORAZEPAM 0.5 MG: 0.5 TABLET ORAL at 06:12

## 2020-02-25 RX ADMIN — INSULIN LISPRO 3 UNITS: 100 INJECTION, SOLUTION INTRAVENOUS; SUBCUTANEOUS at 16:50

## 2020-02-25 RX ADMIN — METHYLPREDNISOLONE SODIUM SUCCINATE 40 MG: 40 INJECTION, POWDER, FOR SOLUTION INTRAMUSCULAR; INTRAVENOUS at 00:25

## 2020-02-25 RX ADMIN — GUAIFENESIN 600 MG: 600 TABLET ORAL at 09:11

## 2020-02-25 NOTE — ASSESSMENT & PLAN NOTE
· Initially presented for fever, shortness of breath  · Prior to ER was seen at urgent care where was found to be hypoxic, reportedly down to 79%  · Received Tamiflu in the ER, will continue

## 2020-02-25 NOTE — MALNUTRITION/BMI
This medical record reflects one or more clinical indicators suggestive of morbid obesity  BMI Findings:  BMI Classifications: Morbid Obesity 50-59  9(Treated with diet ed)     Body mass index is 51 24 kg/m²  See Nutrition note dated 2/25/20 for additional details  Completed nutrition assessment is viewable in the nutrition documentation

## 2020-02-25 NOTE — PLAN OF CARE
Problem: Potential for Falls  Goal: Patient will remain free of falls  Description  INTERVENTIONS:  - Assess patient frequently for physical needs  -  Identify cognitive and physical deficits and behaviors that affect risk of falls  -  [place] fall precautions as indicated by assessment   - Educate patient/family on patient safety including physical limitations  - Instruct patient to call for assistance with activity based on assessment  - Modify environment to reduce risk of injury  - Consider OT/PT consult to assist with strengthening/mobility  Outcome: Progressing     Problem: INFECTION - ADULT  Goal: Absence or prevention of progression during hospitalization  Description  INTERVENTIONS:  - Assess and monitor for signs and symptoms of infection  - Monitor lab/diagnostic results  - Monitor all insertion sites, i e  indwelling lines, tubes, and drains  - Monitor endotracheal if appropriate and nasal secretions for changes in amount and color  - [place] appropriate cooling/warming therapies per order  - Administer medications as ordered  - Instruct and encourage patient and family to use good hand hygiene technique  - Identify and instruct in appropriate isolation precautions for identified infection/condition  Outcome: Progressing     Problem: SAFETY ADULT  Goal: Patient will remain free of falls  Description  INTERVENTIONS:  - Assess patient frequently for physical needs  -  Identify cognitive and physical deficits and behaviors that affect risk of falls    -  [place] fall precautions as indicated by assessment   - Educate patient/family on patient safety including physical limitations  - Instruct patient to call for assistance with activity based on assessment  - Modify environment to reduce risk of injury  - Consider OT/PT consult to assist with strengthening/mobility  Outcome: Progressing  Goal: Maintain or return to baseline ADL function  Description  INTERVENTIONS:  -  Assess patient's ability to carry out ADLs; assess patient's baseline for ADL function and identify physical deficits which impact ability to perform ADLs (bathing, care of mouth/teeth, toileting, grooming, dressing, etc )  - Assess/evaluate cause of self-care deficits   - Assess range of motion  - Assess patient's mobility; develop plan if impaired  - Assess patient's need for assistive devices and provide as appropriate  - Encourage maximum independence but intervene and supervise when necessary  - Involve family in performance of ADLs  - Assess for home care needs following discharge   - Consider OT consult to assist with ADL evaluation and planning for discharge  - Provide patient education as appropriate  Outcome: Progressing  Goal: Maintain or return mobility status to optimal level  Description  INTERVENTIONS:  - Assess patient's baseline mobility status (ambulation, transfers, stairs, etc )    - Identify cognitive and physical deficits and behaviors that affect mobility  - Identify mobility aids required to assist with transfers and/or ambulation (gait belt, sit-to-stand, lift, walker, cane, etc )  - [place] fall precautions as indicated by assessment  - Record patient progress and toleration of activity level on Mobility SBAR; progress patient to next Phase/Stage  - Instruct patient to call for assistance with activity based on assessment  - Consider rehabilitation consult to assist with strengthening/weightbearing, etc   Outcome: Progressing     Problem: DISCHARGE PLANNING  Goal: Discharge to home or other facility with appropriate resources  Description  INTERVENTIONS:  - Identify barriers to discharge w/patient and caregiver  - Arrange for needed discharge resources and transportation as appropriate  - Identify discharge learning needs (meds, wound care, etc )  - Arrange for interpretive services to assist at discharge as needed  - Refer to Case Management Department for coordinating discharge planning if the patient needs post-hospital services based on physician/advanced practitioner order or complex needs related to functional status, cognitive ability, or social support system  Outcome: Progressing     Problem: Knowledge Deficit  Goal: Patient/family/caregiver demonstrates understanding of disease process, treatment plan, medications, and discharge instructions  Description  Complete learning assessment and assess knowledge base    Interventions:  - Provide teaching at level of understanding  - Provide teaching via preferred learning methods  Outcome: Progressing     Problem: RESPIRATORY - ADULT  Goal: Achieves optimal ventilation and oxygenation  Description  INTERVENTIONS:  - Assess for changes in respiratory status  - Assess for changes in mentation and behavior  - Position to facilitate oxygenation and minimize respiratory effort  - Oxygen administered by appropriate delivery if ordered  - Initiate smoking cessation education as indicated  - Encourage broncho-pulmonary hygiene including cough, deep breathe, Incentive Spirometry  - Assess the need for suctioning and aspirate as needed  - Assess and instruct to report SOB or any respiratory difficulty  - Respiratory Therapy support as indicated  Outcome: Progressing

## 2020-02-25 NOTE — ASSESSMENT & PLAN NOTE
· Presents with hypoxia, fevers, shortness of breath  · Seen at urgent care earlier today and diagnosed with URI, did not want hospital evaluation at that time  · Fevers at home  · No leukocytosis  · Chest x-ray:  Perihilar consolidation    Radiology read is pending  · No old films for comparison  · Will continue antibiotics per COPD pathway including azithromycin and ceftriaxone  · Oxygen protocol  · Respiratory protocol  · Check procalcitonin  · Follow blood cultures

## 2020-02-25 NOTE — PLAN OF CARE
Problem: Potential for Falls  Goal: Patient will remain free of falls  Description  INTERVENTIONS:  - Assess patient frequently for physical needs  -  Identify cognitive and physical deficits and behaviors that affect risk of falls    -  [place] fall precautions as indicated by assessment   - Educate patient/family on patient safety including physical limitations  - Instruct patient to call for assistance with activity based on assessment  - Modify environment to reduce risk of injury  - Consider OT/PT consult to assist with strengthening/mobility  Outcome: Not Progressing     Problem: INFECTION - ADULT  Goal: Absence or prevention of progression during hospitalization  Description  INTERVENTIONS:  - Assess and monitor for signs and symptoms of infection  - Monitor lab/diagnostic results  - Monitor all insertion sites, i e  indwelling lines, tubes, and drains  - Monitor endotracheal if appropriate and nasal secretions for changes in amount and color  - [place] appropriate cooling/warming therapies per order  - Administer medications as ordered  - Instruct and encourage patient and family to use good hand hygiene technique  - Identify and instruct in appropriate isolation precautions for identified infection/condition  Outcome: Not Progressing

## 2020-02-25 NOTE — H&P
H&P- Elvi Bryan 1945, 69 y o  female MRN: [id number]    Unit/Bed#: -01 Encounter: [id number]    Primary Care Provider: Cira Lanier MD   Date and time admitted to hospital: 2/24/2020  6:09 PM        * COPD exacerbation ([address] )  Assessment & Plan  · Oxygen saturation 78% at triage, wheezing, tachypneic  · Chest x-ray:  Right perihilar consolidation  Radiology read is pending  · Oxygen protocol  · Respiratory protocol  · Symptom control  · Will follow COPD exacerbation pathway continuing azithromycin and ceftriaxone started in the ER  · Received Solu-Medrol 125 mg IV in the ER, will continue at 40 mg IV t i d   · Encourage smoking cessation    Influenza A  Assessment & Plan  · Initially presented for fever, shortness of breath  · Prior to ER was seen at urgent care where was found to be hypoxic, reportedly down to 79%  · Received Tamiflu in the ER, will continue    Pulmonary infiltrate in right lung on CXR  Assessment & Plan  · Presents with hypoxia, fevers, shortness of breath  · Seen at urgent care earlier today and diagnosed with URI, did not want hospital evaluation at that time  · Fevers at home  · No leukocytosis  · Chest x-ray:  Perihilar consolidation    Radiology read is pending  · No old films for comparison  · Will continue antibiotics per COPD pathway including azithromycin and ceftriaxone  · Oxygen protocol  · Respiratory protocol  · Check procalcitonin  · Follow blood cultures    Essential hypertension  Assessment & Plan  · BP initially elevated  · Continue atenolol and lisinopril  · Monitor blood pressure    Type 2 diabetes mellitus with hyperglycemia, without long-term current use of insulin (HCC)  Assessment & Plan  No results found for: HGBA1C    Recent Labs     02/25/20  0021   POCGLU 339*       Blood Sugar Average: Last 72 hrs:  (P) 339     · Diabetic diet  · Hold glipizide while in hospital  · Fingerstick blood sugar with sliding scale coverage  · Check hemoglobin A1c      Thrombocytopenia (HCC)  Assessment & Plan  · Platelets 80  · Daily CBC and trend platelets  · Monitor for bleeding    History of breast cancer  Assessment & Plan  · 2017 Upper outer left lumpectomy and radiation  · Last mammogram March 2019:  Prominent post treatment changes which are probably benign  · Continue outpatient follow-up          VTE Prophylaxis: Enoxaparin (Lovenox)  / sequential compression device   Code Status:  Full  POLST: POLST form is not discussed and not completed at this time  Discussion with family:  Patient    Anticipated Length of Stay:  Patient will be admitted on an Inpatient basis with an anticipated length of stay of  greater than 2 midnights  Justification for Hospital Stay:  As described in plan above    Total Time for Visit, including Counseling / Coordination of Care: 45 minutes  Greater than 50% of this total time spent on direct patient counseling and coordination of care  Chief Complaint:   Shortness of breath    History of Present Illness:    Lazara Florez is a 69 y o  female with history of COPD, essential hypertension, type 2 diabetes non-insulin-dependent, history of left breast cancer, and current smoker who presents with a two day history of shortness of breath and fever  The shortness of breath was gradual and worsening  Was worsened by activity  It is associated with a cough productive of clear to yellow sputum  She said she had fever on two separate occasions at home  Denies chills  Denies hemoptysis  No chest pain  Denies nausea or vomiting  No syncope or focal weakness  She had gone to an urgent center and was diagnosed with URI  It was recommended that she go to the ER for possible admission, but she did not want to come at that time  At home, her shortness of breath worsened, she decided to come to the ER  In triage, her saturations were noted to be 70%    She was improved with administration of nasal cannula oxygen at 2 L and nebulizer treatments  Review of Systems:    Review of Systems   Constitutional: Positive for fever  Negative for chills  HENT: Positive for congestion  Respiratory: Positive for shortness of breath  Negative for cough  Cardiovascular: Positive for leg swelling  Negative for chest pain and palpitations  Gastrointestinal: Negative for abdominal distention, abdominal pain, constipation, diarrhea, nausea and vomiting  Genitourinary: Negative for dysuria and frequency  Musculoskeletal: Negative for arthralgias and myalgias  Neurological: Negative for dizziness, syncope, weakness and headaches  All other systems reviewed and are negative  Past Medical and Surgical History:     Past Medical History:   Diagnosis Date    Breast cancer ([facility] )     Diabetes mellitus ([facility] )     Hypertension        Past Surgical History:   Procedure Laterality Date    BREAST SURGERY      left lumpectomy    CARPAL TUNNEL RELEASE      CATARACT EXTRACTION      JOINT REPLACEMENT         Meds/Allergies:    Prior to Admission medications    Medication Sig Start Date End Date Taking?  Authorizing Provider   aspirin (ECOTRIN LOW STRENGTH) 81 mg EC tablet Take 81 mg by mouth daily   Yes Historical Provider, MD   atenolol (TENORMIN) 50 mg tablet  1/7/20  Yes Historical Provider, MD   celecoxib (CeleBREX) 200 mg capsule  1/7/20  Yes Historical Provider, MD   glipiZIDE (GLUCOTROL XL) 2 5 mg 24 hr tablet  2/20/20  Yes Historical Provider, MD   glipiZIDE (GLUCOTROL XL) 5 mg 24 hr tablet  2/20/20  Yes Historical Provider, MD   levothyroxine 150 mcg tablet  2/20/20  Yes Historical Provider, MD   lisinopril (ZESTRIL) 20 mg tablet  2/20/20  Yes Historical Provider, MD   nystatin-triamcinolone (MYCOLOG-II) cream  12/10/19  Yes Historical Provider, MD   ofloxacin (OCUFLOX) 0 3 % ophthalmic solution  2/22/20  Yes Historical Provider, MD   prednisoLONE acetate (PRED FORTE) 1 % ophthalmic suspension  2/22/20  Yes Historical Provider, MD albuterol (5 mg/mL) 0 5 % nebulizer solution Take 2 5 mg by nebulization every 6 (six) hours as needed for wheezing    Historical Provider, MD   celecoxib (CeleBREX) 50 MG capsule  12/4/19   Historical Provider, MD   ketorolac (ACULAR) 0 5 % ophthalmic solution  2/20/20   Historical Provider, MD   albuterol (PROVENTIL HFA,VENTOLIN HFA) 90 mcg/act inhaler Inhale 2 puffs every 6 (six) hours as needed for wheezing  2/24/20  Historical Provider, MD     I have reviewed home medications with patient personally  Allergies: Allergies   Allergen Reactions    Adhesive [Medical Tape]     Biaxin [Clarithromycin]     [place]        Social History:     Marital Status:    Occupation:  Not discussed  Patient Pre-hospital Living Situation:  Home  Patient Pre-hospital Level of Mobility:  Independent  Patient Pre-hospital Diet Restrictions:  Diabetic  Substance Use History:   Social History     Substance and Sexual Activity   Alcohol Use Never    Frequency: Never     Social History     Tobacco Use   Smoking Status Current Every Day Smoker    Packs/day: 0 25    Types: Cigarettes   Smokeless Tobacco Never Used     Social History     Substance and Sexual Activity   Drug Use Yes    Types: Marijuana       Family History:    History reviewed  No pertinent family history  Physical Exam:     Vitals:   Blood Pressure: 160/90 (02/25/20 0020)  Pulse: 80 (02/25/20 0015)  Temperature: 97 9 °F (36 6 °C) (02/25/20 0015)  Temp Source: Axillary (02/24/20 2207)  Respirations: 22 (02/25/20 0015)  Height: 5' 3" (160 cm) (02/24/20 2207)  Weight - Scale: 131 kg (289 lb 3 9 oz) (02/24/20 2207)  SpO2: 95 % (02/25/20 0508)    Physical Exam   Constitutional: She is oriented to person, place, and time  She appears well-developed and well-nourished  HENT:   Head: Normocephalic and atraumatic  Mouth/Throat: Oropharynx is clear and moist    Eyes: Pupils are equal, round, and reactive to light   EOM are normal    Neck: Normal range of motion  Neck supple  Cardiovascular: Normal rate, regular rhythm, normal heart sounds and intact distal pulses  Exam reveals no gallop and no friction rub  No murmur heard  Pulmonary/Chest: Tachypnea noted  No respiratory distress  She has wheezes  She has rhonchi  Abdominal: Soft  Bowel sounds are normal  She exhibits no distension and no mass  There is no tenderness  There is no guarding  Musculoskeletal: Normal range of motion  She exhibits edema  She exhibits no tenderness or deformity  Neurological: She is alert and oriented to person, place, and time  Skin: Skin is warm  Capillary refill takes less than 2 seconds  She is diaphoretic  Nursing note and vitals reviewed  Additional Data:     Lab Results: I have personally reviewed pertinent reports  Results from last 7 days   Lab Units 02/25/20  0116 02/24/20  1835   WBC Thousand/uL  --  3 84*   HEMOGLOBIN g/dL  --  13 2   HEMATOCRIT %  --  40 5   PLATELETS Thousands/uL 83* 80*   NEUTROS PCT %  --  69   LYMPHS PCT %  --  17   MONOS PCT %  --  12   EOS PCT %  --  1     Results from last 7 days   Lab Units 02/24/20  1835   SODIUM mmol/L 140   POTASSIUM mmol/L 4 0   CHLORIDE mmol/L 102   CO2 mmol/L 35*   BUN mg/dL 9   CREATININE mg/dL 0 67   ANION GAP mmol/L 3*   CALCIUM mg/dL 8 2*   ALBUMIN g/dL 3 1*   TOTAL BILIRUBIN mg/dL 0 43   ALK PHOS U/L 68   ALT U/L 26   AST U/L 32   GLUCOSE RANDOM mg/dL 247*         Results from last 7 days   Lab Units 02/25/20  0021   POC GLUCOSE mg/dl 339*               Imaging: I have personally reviewed pertinent reports     and I have personally reviewed pertinent films in PACS    XR chest 1 view portable   ED Interpretation by Keisha Fowler DO (02/24 1851)   Right perihilar consolidation          EKG, Pathology, and Other Studies Reviewed on Admission:   · EKG: NSR rate of 71 with no ectopy or evidence of ischemia or acute infarct    Allscripts / Epic Records Reviewed: Yes     ** Please Note: This note has been constructed using a voice recognition system   **

## 2020-02-25 NOTE — PROGRESS NOTES
Progress Note - Pamella [place] 1945, 69 y o  female MRN: [id number]    Unit/Bed#: -01 Encounter: [id number]    Primary Care Provider: Shaka Terrell MD   Date and time admitted to hospital: 2/24/2020  6:09 PM        * COPD exacerbation ([facility] )  Assessment & Plan  · Oxygen saturation 78% at triage, wheezing, tachypneic  · Notified by Radiology x-ray consistent with patchy density in the right infrahilar region suspicious for infiltrate and moderate pulmonary vascular congestion was noted as well  · Oxygen protocol  · Respiratory protocol  · Symptom control  · Will follow COPD exacerbation pathway continuing azithromycin and ceftriaxone started in the ER  · Received Solu-Medrol 125 mg IV in the ER, will continue at 40 mg IV t i d   · Encourage smoking cessation  · One time dose of Lasix if symptoms do not improve    Thrombocytopenia (HCC)  Assessment & Plan  · Platelets 80  · Daily CBC and trend platelets  · Monitor for bleeding    History of breast cancer  Assessment & Plan  · 2017 Upper outer left lumpectomy and radiation  · Last mammogram March 2019:  Prominent post treatment changes which are probably benign  · Continue outpatient follow-up    Pulmonary infiltrate in right lung on CXR  Assessment & Plan  · Presents with hypoxia, fevers, shortness of breath  · Seen at urgent care earlier today and diagnosed with URI, did not want hospital evaluation at that time  · Fevers at home  · No leukocytosis  · Chest x-ray:  Perihilar consolidation    Radiology read is pending  · No old films for comparison  · Will continue antibiotics per COPD pathway including azithromycin and ceftriaxone  · Oxygen protocol  · Respiratory protocol  · Check procalcitonin  · Follow blood cultures    Essential hypertension  Assessment & Plan  · BP initially elevated  · Continue atenolol and lisinopril  · Monitor blood pressure    Type 2 diabetes mellitus with hyperglycemia, without long-term current use of insulin [facility])  Assessment & Plan  No results found for: HGBA1C    Recent Labs     20  0021   POCGLU 339*       Blood Sugar Average: Last 72 hrs:  (P) 339     · Diabetic diet  · Hold glipizide while in hospital  · Fingerstick blood sugar with sliding scale coverage  · Check hemoglobin A1c      Influenza A  Assessment & Plan  · Initially presented for fever, shortness of breath  · Prior to ER was seen at urgent care where was found to be hypoxic, reportedly down to 79%  · Received Tamiflu in the ER, will continue      VTE Pharmacologic Prophylaxis:   Pharmacologic: Enoxaparin (Lovenox) will hold Lovenox for now  Platelet count 80  Mechanical VTE Prophylaxis in Place: No    Patient Centered Rounds: I have performed bedside rounds with nursing staff today  Time Spent for Care: 15 minutes  More than 50% of total time spent on counseling and coordination of care as described above  Current Length of Stay: 1 day(s)    Current Patient Status: Inpatient       Code Status: Level 1 - Full Code      Subjective:   No acute distress    Objective:     Vitals:   Temp (24hrs), Av 2 °F (36 8 °C), Min:97 7 °F (36 5 °C), Max:99 8 °F (37 7 °C)    Temp:  [97 7 °F (36 5 °C)-99 8 °F (37 7 °C)] 97 7 °F (36 5 °C)  HR:  [72-92] 80  Resp:  [16-30] 18  BP: ()/() 118/94  SpO2:  [76 %-100 %] 92 %  Body mass index is 51 24 kg/m²  Input and Output Summary (last 24 hours): Intake/Output Summary (Last 24 hours) at 2020 1004  Last data filed at 2020 0736  Gross per 24 hour   Intake 300 ml   Output 1000 ml   Net -700 ml       Physical Exam:     Physical Exam   Constitutional: She is oriented to person, place, and time  She appears well-developed and well-nourished  HENT:   Head: Normocephalic and atraumatic  Eyes: Pupils are equal, round, and reactive to light  EOM are normal    Neck: Normal range of motion  Neck supple  Cardiovascular: Normal rate  No murmur heard    Pulmonary/Chest: Effort normal and breath sounds normal  No respiratory distress  Abdominal: Soft  Bowel sounds are normal  She exhibits no distension  Neurological: She is alert and oriented to person, place, and time  Skin: Skin is warm and dry  Additional Data:     Labs:    Results from last 7 days   Lab Units 02/25/20  0644   WBC Thousand/uL 4 15*   HEMOGLOBIN g/dL 13 3   HEMATOCRIT % 41 5   PLATELETS Thousands/uL 83*   NEUTROS PCT % 86*   LYMPHS PCT % 9*   MONOS PCT % 4   EOS PCT % 0     Results from last 7 days   Lab Units 02/25/20  0644 02/24/20  1835   POTASSIUM mmol/L 5 2 4 0   CHLORIDE mmol/L 104 102   CO2 mmol/L 35* 35*   BUN mg/dL 14 9   CREATININE mg/dL 0 76 0 67   CALCIUM mg/dL 8 2* 8 2*   ALK PHOS U/L  --  68   ALT U/L  --  26   AST U/L  --  32           * I Have Reviewed All Lab Data Listed Above  * Additional Pertinent Lab Tests Reviewed: All Labs Within Last 24 Hours Reviewed        Recent Cultures (last 7 days):     Results from last 7 days   Lab Units 02/25/20  0644 02/25/20  0117   BLOOD CULTURE  Received in Microbiology Lab  Culture in Progress  Received in Microbiology Lab  Culture in Progress         Last 24 Hours Medication List:     Current Facility-Administered Medications:  albuterol 2 5 mg Nebulization Q4H PRN Eligha Moment, CRNP   aspirin 81 mg Oral Daily Eligha Moment, CRNP   atenolol 50 mg Oral Daily Eligha Moment, CRNP   azithromycin 500 mg Intravenous Q24H Eligha Moment, CRNP   cefTRIAXone 1,000 mg Intravenous Q24H Eligha Moment, CRNP   enoxaparin 40 mg Subcutaneous Daily Eligha Moment, CRNP   guaiFENesin 600 mg Oral BID Eligha Moment, CRNP   insulin lispro 2-12 Units Subcutaneous HS Eligha Moment, CRNP   ipratropium 0 5 mg Nebulization TID Eligha Moment, CRNP   levalbuterol 1 25 mg Nebulization TID Gypsy Leigh MD   levothyroxine 150 mcg Oral Early Morning Eligha Moment, CRNP   lisinopril 20 mg Oral Daily Eligha Moment, CRNP   methylPREDNISolone sodium succinate 40 mg Intravenous [facility] ROSEANN Guzmán   oseltamivir 75 mg Oral Q12H [address], CRNP        Today, Patient Was Seen By: Diana Maguire DO    ** Please Note: Dictation voice to text software may have been used in the creation of this document   **

## 2020-02-25 NOTE — RESPIRATORY THERAPY NOTE
RT Protocol Note  Mo White 69 y o  female MRN: [id number]  Unit/Bed#: -01 Encounter: [id number]    Assessment    Principal Problem:    Influenza A  Active Problems:    Type 2 diabetes mellitus with hyperglycemia, without long-term current use of insulin (HCC)    Essential hypertension    Pulmonary infiltrate in right lung on CXR    History of breast cancer    Acquired hypothyroidism      Home Pulmonary Medications:  Albuterol PRN       Past Medical History:   Diagnosis Date    Breast cancer ([facility] )     Diabetes mellitus ([place] [address] )     Hypertension      Social History     Socioeconomic History    Marital status:      Spouse name: None    Number of children: None    Years of education: None    Highest education level: None   Occupational History    None   Social Needs    Financial resource strain: None    Food insecurity:     Worry: None     Inability: None    Transportation needs:     Medical: None     Non-medical: None   Tobacco Use    Smoking status: Current Every Day Smoker     Packs/day: 0 25     Types: Cigarettes    Smokeless tobacco: Never Used   Substance and Sexual Activity    Alcohol use: Never     Frequency: Never    Drug use: Yes     Types: Marijuana    Sexual activity: None   Lifestyle    Physical activity:     Days per week: None     Minutes per session: None    Stress: None   Relationships    Social connections:     Talks on phone: None     Gets together: None     Attends Mosque service: None     Active member of club or organization: None     Attends meetings of clubs or organizations: None     Relationship status: None    Intimate partner violence:     Fear of current or ex partner: None     Emotionally abused: None     Physically abused: None     Forced sexual activity: None   Other Topics Concern    None   Social History Narrative    None       Subjective    Subjective Data: patient is current everyday smoker with COPD     She uses albuterol as needed for management of shortness of breath    Objective    Physical Exam:   Assessment Type: Assess only  General Appearance: Awake, Alert  Respiratory Pattern: Normal  Chest Assessment: Chest expansion symmetrical  Bilateral Breath Sounds: Diminished, Clear    Vitals:  Blood pressure (!) 180/100, pulse 85, temperature 97 8 °F (36 6 °C), temperature source Axillary, resp  rate 22, height 5' 3" (1 6 m), weight 131 kg (289 lb 3 9 oz), SpO2 97 %            Imaging and other studies: Pending          Plan    Respiratory Plan: Home Bronchodilator Patient pathway        Resp Comments: on 5L nasal cannula after inability to tolerate bipap

## 2020-02-25 NOTE — RAPID RESPONSE
Progress Note - Rapid Response   Jennifer Atkins 69 y o  female MRN: [id number]    Time Called ( Time): 2169  Date Called: 02/24/2020  Level of Care: MS  Room#: 562  ZAQABIZ Time ( Time): 2058  Event End Time ( Time): 6917  Primary reason for call: Acute change in SPO2  Interventions:  Airway/Breathing:  O2 Mask/Nasal  Circulation: N/A  Other Treatments: N/A       Assessment:   1  Acute hypoxic respiratory failure 2/2 influenza A and acute COPD exacerbation    Plan:   · Oxygen as need, maintain oxygen saturation > 90%  · Monitor spO2 continuously  · BiPAP trial for increased work of breathing  · Already given solumedrol 125 mg IV in ED, would continue steroids for COPD exacerbation  · Continue respiratory treatments and give one now  · Remain on med-surg, however will transfer for higher level of care if needed       HPI/Chief Complaint (Background/Situation):   Jennifer Atkins is a 69y o  year old female who presents to [address] [place] [address] this evening with shortness of breath and fever since Friday (02/21)  Patient was seen earlier today at urgent care for same complaints and was encouraged to go to ED for further evaluation  Patient did have episode of desaturation while in ED, noted to be 70% on room air during triage which did improve with nasal cannula  It was also noted that patient desaturates with any type of activity while in ED  Patient tested positive for influenza A  It was felt that the patient was having a COPD exacerbation and possible pneumonia right middle lobe  Chest x-ray findings consistent with right perihilar infiltrate  Patient was given solumedrol 125 mg IV and started on Tamiflu, ceftriaxone and azithromycin  Shortly after patient arrived to room on Med surge, patient was on to bedpan at which point she did desaturate to [de-identified]  Patient's oxygen was increased from 2 to 4 L nasal cannula and oxygen saturations began to slowly improve    Patient did have increased work of breathing activity was temporarily placed on NRB  Lungs auscultate for wheezes and coarse rhonchi throughout  Discussed with primary team and respiratory patient would benefit from continuing with steroids and respiratory treatment  Possible trial of BiPAP for increased work breathing discussed well  Patient reported improved shortness of breath post respiratory treatment  Improvement in work of breathing noted  Patient to remain on med surge at this time however would transfer to a higher level of care if breathing does not improve  Discussed this with primary team and critical care attending  Historical Information   Past Medical History:   Diagnosis Date    [facility] cancer ([facility] )     Diabetes mellitus ([facility] )     Hypertension      Past Surgical History:   Procedure Laterality Date    BREAST SURGERY      left lumpectomy    CARPAL TUNNEL RELEASE      CATARACT EXTRACTION      JOINT REPLACEMENT       Social History   Social History     Substance and Sexual Activity   Alcohol Use Never    Frequency: Never     Social History     Substance and Sexual Activity   Drug Use Yes    Types: Marijuana     Social History     Tobacco Use   Smoking Status Current Every Day Smoker    Packs/day: 0 25    Types: Cigarettes   Smokeless Tobacco Never Used     Family History: History reviewed  No pertinent family history  Meds/Allergies         No current facility-administered medications for this encounter       Allergies   Allergen Reactions    Adhesive [Medical Tape]     Biaxin [Clarithromycin]     Citrus        ROS: General ROS: positive for  - shortness of breath, dyspnea on exertion, increased work of breathing    Vitals:   Vitals:    02/24/20 2023 02/24/20 2100 02/24/20 2207 02/24/20 2249   BP: 159/76 156/78 93/72 (!) 204/125   BP Location: Right arm Right arm Right arm    Pulse: 74 92 78 78   Resp: 18 (!) 28 16 16   Temp:   97 8 °F (36 6 °C)    TempSrc:   Axillary    SpO2: 92% (!) 89% 93% 100%   Weight:   131 kg (289 lb 3 9 oz)    Height:   5' 3" (1 6 m)        Physical Exam:  Gen: Patient resting in bed, HOB at 45 degrees, increased work of breathing  HEENT:NC/AT, PERRL, nose midline, O/P clear and moist  Neck:Supple, no lymphadenopathy  Chest:Coarse rhonchi and wheezes throughout, tacypnea, increased work of breathing but improving  Cor:RRR, positive peripheral pulses  Abd:soft, obese  Ext:OCHOA, no edema  Neuro:Alert, oriented x3, CN II-XII intact  Skin:pink, warm, dry      Intake/Output Summary (Last 24 hours) at 2/24/2020 2252  Last data filed at 2/24/2020 2124  Gross per 24 hour   Intake 300 ml   Output    Net 300 ml       Respiratory    Lab Data (Last 4 hours)    None         O2/Vent Data (Last 4 hours)    None              Invasive Devices     Peripheral Intravenous Line            Peripheral IV 02/24/20 Right Antecubital less than 1 day                DIAGNOSTIC DATA:    Lab: I have personally reviewed pertinent lab results  CBC:   Results from last 7 days   Lab Units 02/24/20  1835   WBC Thousand/uL 3 84*   HEMOGLOBIN g/dL 13 2   HEMATOCRIT % 40 5   PLATELETS Thousands/uL 80*     CMP:   Results from last 7 days   Lab Units 02/24/20  1835   POTASSIUM mmol/L 4 0   CHLORIDE mmol/L 102   CO2 mmol/L 35*   BUN mg/dL 9   CREATININE mg/dL 0 67   CALCIUM mg/dL 8 2*   ALK PHOS U/L 68   ALT U/L 26   AST U/L 32     PT/INR:   No results found for: PT, INR,   Magnesium: No components found for: MAG,   Phosphorous: No results found for: PHOS    Microbiology:  No results found for: Neymar Furnace, SPUTUMCULTUR      OUTCOME:   Stayed in room   Family notified of transfer: NA  Family member contacted: NA  Code Status: No Order Clarified at time of RRT: Level one/full code  Critical Care Time: Total Critical Care time spent 15 minutes excluding procedures, teaching and family updates

## 2020-02-25 NOTE — ASSESSMENT & PLAN NOTE
· Oxygen saturation 78% at triage, wheezing, tachypneic  · Notified by Radiology x-ray consistent with patchy density in the right infrahilar region suspicious for infiltrate and moderate pulmonary vascular congestion was noted as well    · Oxygen protocol  · Respiratory protocol  · Symptom control  · Will follow COPD exacerbation pathway continuing azithromycin and ceftriaxone started in the ER  · Received Solu-Medrol 125 mg IV in the ER, will continue at 40 mg IV t i d   · Encourage smoking cessation  · One time dose of Lasix if symptoms do not improve

## 2020-02-25 NOTE — ASSESSMENT & PLAN NOTE
No results found for: HGBA1C    Recent Labs     02/25/20  0021   POCGLU 339*       Blood Sugar Average: Last 72 hrs:  (P) 339     · Diabetic diet  · Hold glipizide while in hospital  · Fingerstick blood sugar with sliding scale coverage  · Check hemoglobin A1c

## 2020-02-25 NOTE — ASSESSMENT & PLAN NOTE
· Oxygen saturation 78% at triage, wheezing, tachypneic  · Chest x-ray:  Right perihilar consolidation    Radiology read is pending  · Oxygen protocol  · Respiratory protocol  · Symptom control  · Will follow COPD exacerbation pathway continuing azithromycin and ceftriaxone started in the ER  · Received Solu-Medrol 125 mg IV in the ER, will continue at 40 mg IV t i d   · Encourage smoking cessation

## 2020-02-25 NOTE — ASSESSMENT & PLAN NOTE
· 2017 Upper outer left lumpectomy and radiation  · Last mammogram March 2019:  Prominent post treatment changes which are probably benign  · Continue outpatient follow-up

## 2020-02-26 ENCOUNTER — APPOINTMENT (INPATIENT)
Dept: NON INVASIVE DIAGNOSTICS | Facility: HOSPITAL | Age: 75
DRG: 189 | End: 2020-02-26
Payer: MEDICARE

## 2020-02-26 PROBLEM — J96.01 ACUTE RESPIRATORY FAILURE WITH HYPOXIA (HCC): Status: ACTIVE | Noted: 2020-02-26

## 2020-02-26 PROBLEM — E66.01 MORBID OBESITY DUE TO EXCESS CALORIES (HCC): Status: ACTIVE | Noted: 2020-02-26

## 2020-02-26 LAB
ANION GAP SERPL CALCULATED.3IONS-SCNC: 1 MMOL/L (ref 4–13)
ATRIAL RATE: 71 BPM
BASOPHILS # BLD AUTO: 0.01 THOUSANDS/ΜL (ref 0–0.1)
BASOPHILS NFR BLD AUTO: 0 % (ref 0–1)
BUN SERPL-MCNC: 19 MG/DL (ref 5–25)
CALCIUM SERPL-MCNC: 8.2 MG/DL (ref 8.3–10.1)
CHLORIDE SERPL-SCNC: 103 MMOL/L (ref 100–108)
CO2 SERPL-SCNC: 38 MMOL/L (ref 21–32)
CREAT SERPL-MCNC: 0.74 MG/DL (ref 0.6–1.3)
EOSINOPHIL # BLD AUTO: 0 THOUSAND/ΜL (ref 0–0.61)
EOSINOPHIL NFR BLD AUTO: 0 % (ref 0–6)
ERYTHROCYTE [DISTWIDTH] IN BLOOD BY AUTOMATED COUNT: 13.5 % (ref 11.6–15.1)
GFR SERPL CREATININE-BSD FRML MDRD: 81 ML/MIN/1.73SQ M
GLUCOSE SERPL-MCNC: 304 MG/DL (ref 65–140)
GLUCOSE SERPL-MCNC: 309 MG/DL (ref 65–140)
GLUCOSE SERPL-MCNC: 310 MG/DL (ref 65–140)
GLUCOSE SERPL-MCNC: 338 MG/DL (ref 65–140)
GLUCOSE SERPL-MCNC: 339 MG/DL (ref 65–140)
GLUCOSE SERPL-MCNC: 377 MG/DL (ref 65–140)
HCT VFR BLD AUTO: 43.4 % (ref 34.8–46.1)
HGB BLD-MCNC: 13.4 G/DL (ref 11.5–15.4)
IMM GRANULOCYTES # BLD AUTO: 0.05 THOUSAND/UL (ref 0–0.2)
IMM GRANULOCYTES NFR BLD AUTO: 1 % (ref 0–2)
LYMPHOCYTES # BLD AUTO: 0.52 THOUSANDS/ΜL (ref 0.6–4.47)
LYMPHOCYTES NFR BLD AUTO: 7 % (ref 14–44)
MCH RBC QN AUTO: 31.6 PG (ref 26.8–34.3)
MCHC RBC AUTO-ENTMCNC: 30.9 G/DL (ref 31.4–37.4)
MCV RBC AUTO: 102 FL (ref 82–98)
MONOCYTES # BLD AUTO: 0.33 THOUSAND/ΜL (ref 0.17–1.22)
MONOCYTES NFR BLD AUTO: 5 % (ref 4–12)
NEUTROPHILS # BLD AUTO: 6.09 THOUSANDS/ΜL (ref 1.85–7.62)
NEUTS SEG NFR BLD AUTO: 87 % (ref 43–75)
NRBC BLD AUTO-RTO: 0 /100 WBCS
P AXIS: 82 DEGREES
PLATELET # BLD AUTO: 127 THOUSANDS/UL (ref 149–390)
PMV BLD AUTO: 9.6 FL (ref 8.9–12.7)
POTASSIUM SERPL-SCNC: 5.2 MMOL/L (ref 3.5–5.3)
PR INTERVAL: 152 MS
QRS AXIS: 4 DEGREES
QRSD INTERVAL: 74 MS
QT INTERVAL: 414 MS
QTC INTERVAL: 449 MS
RBC # BLD AUTO: 4.24 MILLION/UL (ref 3.81–5.12)
SODIUM SERPL-SCNC: 142 MMOL/L (ref 136–145)
T WAVE AXIS: 52 DEGREES
VENTRICULAR RATE: 71 BPM
WBC # BLD AUTO: 7 THOUSAND/UL (ref 4.31–10.16)

## 2020-02-26 PROCEDURE — 99232 SBSQ HOSP IP/OBS MODERATE 35: CPT | Performed by: PHYSICIAN ASSISTANT

## 2020-02-26 PROCEDURE — 93306 TTE W/DOPPLER COMPLETE: CPT

## 2020-02-26 PROCEDURE — 94640 AIRWAY INHALATION TREATMENT: CPT

## 2020-02-26 PROCEDURE — 94760 N-INVAS EAR/PLS OXIMETRY 1: CPT

## 2020-02-26 PROCEDURE — 80048 BASIC METABOLIC PNL TOTAL CA: CPT | Performed by: INTERNAL MEDICINE

## 2020-02-26 PROCEDURE — 85025 COMPLETE CBC W/AUTO DIFF WBC: CPT | Performed by: INTERNAL MEDICINE

## 2020-02-26 PROCEDURE — 82948 REAGENT STRIP/BLOOD GLUCOSE: CPT

## 2020-02-26 RX ORDER — FUROSEMIDE 10 MG/ML
40 INJECTION INTRAMUSCULAR; INTRAVENOUS ONCE
Status: DISCONTINUED | OUTPATIENT
Start: 2020-02-26 | End: 2020-02-26

## 2020-02-26 RX ORDER — OXYCODONE HYDROCHLORIDE 5 MG/1
5 TABLET ORAL EVERY 4 HOURS PRN
Qty: 30 TABLET | Refills: 0 | Status: SHIPPED | OUTPATIENT
Start: 2020-02-26 | End: 2020-02-26 | Stop reason: HOSPADM

## 2020-02-26 RX ORDER — AZITHROMYCIN 250 MG/1
500 TABLET, FILM COATED ORAL EVERY 24 HOURS
Status: DISCONTINUED | OUTPATIENT
Start: 2020-02-26 | End: 2020-02-28 | Stop reason: HOSPADM

## 2020-02-26 RX ORDER — KETOROLAC TROMETHAMINE 5 MG/ML
1 SOLUTION OPHTHALMIC 4 TIMES DAILY
Status: DISCONTINUED | OUTPATIENT
Start: 2020-02-26 | End: 2020-02-28 | Stop reason: HOSPADM

## 2020-02-26 RX ORDER — PREDNISOLONE ACETATE 10 MG/ML
1 SUSPENSION/ DROPS OPHTHALMIC 4 TIMES DAILY
Status: DISCONTINUED | OUTPATIENT
Start: 2020-02-26 | End: 2020-02-28 | Stop reason: HOSPADM

## 2020-02-26 RX ADMIN — INSULIN LISPRO 10 UNITS: 100 INJECTION, SOLUTION INTRAVENOUS; SUBCUTANEOUS at 22:27

## 2020-02-26 RX ADMIN — ASPIRIN 81 MG: 81 TABLET, COATED ORAL at 08:57

## 2020-02-26 RX ADMIN — INSULIN LISPRO 3 UNITS: 100 INJECTION, SOLUTION INTRAVENOUS; SUBCUTANEOUS at 07:59

## 2020-02-26 RX ADMIN — INSULIN LISPRO 3 UNITS: 100 INJECTION, SOLUTION INTRAVENOUS; SUBCUTANEOUS at 11:54

## 2020-02-26 RX ADMIN — AZITHROMYCIN 500 MG: 250 TABLET, FILM COATED ORAL at 18:26

## 2020-02-26 RX ADMIN — IPRATROPIUM BROMIDE 0.5 MG: 0.5 SOLUTION RESPIRATORY (INHALATION) at 14:02

## 2020-02-26 RX ADMIN — INSULIN LISPRO 3 UNITS: 100 INJECTION, SOLUTION INTRAVENOUS; SUBCUTANEOUS at 17:19

## 2020-02-26 RX ADMIN — GUAIFENESIN 600 MG: 600 TABLET ORAL at 17:28

## 2020-02-26 RX ADMIN — INSULIN LISPRO 10 UNITS: 100 INJECTION, SOLUTION INTRAVENOUS; SUBCUTANEOUS at 17:19

## 2020-02-26 RX ADMIN — METHYLPREDNISOLONE SODIUM SUCCINATE 40 MG: 40 INJECTION, POWDER, FOR SOLUTION INTRAMUSCULAR; INTRAVENOUS at 14:31

## 2020-02-26 RX ADMIN — LEVALBUTEROL HYDROCHLORIDE 1.25 MG: 1.25 SOLUTION, CONCENTRATE RESPIRATORY (INHALATION) at 19:47

## 2020-02-26 RX ADMIN — IPRATROPIUM BROMIDE 0.5 MG: 0.5 SOLUTION RESPIRATORY (INHALATION) at 08:35

## 2020-02-26 RX ADMIN — IPRATROPIUM BROMIDE 0.5 MG: 0.5 SOLUTION RESPIRATORY (INHALATION) at 19:47

## 2020-02-26 RX ADMIN — OSELTAMIVIR PHOSPHATE 75 MG: 75 CAPSULE ORAL at 22:27

## 2020-02-26 RX ADMIN — LEVALBUTEROL HYDROCHLORIDE 1.25 MG: 1.25 SOLUTION, CONCENTRATE RESPIRATORY (INHALATION) at 08:35

## 2020-02-26 RX ADMIN — METHYLPREDNISOLONE SODIUM SUCCINATE 40 MG: 40 INJECTION, POWDER, FOR SOLUTION INTRAMUSCULAR; INTRAVENOUS at 05:29

## 2020-02-26 RX ADMIN — LEVOTHYROXINE SODIUM 150 MCG: 150 TABLET ORAL at 05:29

## 2020-02-26 RX ADMIN — GUAIFENESIN 600 MG: 600 TABLET ORAL at 08:56

## 2020-02-26 RX ADMIN — METHYLPREDNISOLONE SODIUM SUCCINATE 40 MG: 40 INJECTION, POWDER, FOR SOLUTION INTRAMUSCULAR; INTRAVENOUS at 22:26

## 2020-02-26 RX ADMIN — LEVALBUTEROL HYDROCHLORIDE 1.25 MG: 1.25 SOLUTION, CONCENTRATE RESPIRATORY (INHALATION) at 14:02

## 2020-02-26 RX ADMIN — OSELTAMIVIR PHOSPHATE 75 MG: 75 CAPSULE ORAL at 08:57

## 2020-02-26 RX ADMIN — LISINOPRIL 20 MG: 20 TABLET ORAL at 08:56

## 2020-02-26 RX ADMIN — ATENOLOL 50 MG: 50 TABLET ORAL at 08:56

## 2020-02-26 NOTE — ASSESSMENT & PLAN NOTE
· Presents with hypoxia, fevers, shortness of breath  · Seen at urgent care prior to presentation and diagnosed with URI, did not want hospital evaluation at that time  · Fevers at home  · No leukocytosis  · Chest x-ray:  Patchy right infrahilar infiltrate, moderate pulmonary vascular congestion  · Patient is influenza positive  · No old films for comparison  · Was started on azithromycin and ceftriaxone  Will continue Zithromax in for COPD exacerbation  Will discontinue ceftriaxone for now as procalcitonin is negative x2     · Oxygen protocol  · Respiratory protocol  · Follow blood cultures

## 2020-02-26 NOTE — ASSESSMENT & PLAN NOTE
Lab Results   Component Value Date    HGBA1C 7 7 (H) 02/25/2020       Recent Labs     02/26/20  0529 02/26/20  0714 02/26/20  1054 02/26/20  1717   POCGLU 304* 339* 338* 310*       Blood Sugar Average: Last 72 hrs:  (P) 324 6     · Diabetic diet  · Hold glipizide while in hospital  · Fingerstick blood sugar with sliding scale coverage  ·  10 units subcu t i d  A c ---inc to 15  · Check hemoglobin A1c

## 2020-02-26 NOTE — PROGRESS NOTES
Progress Note - Lalo Shay 1945, 69 y o  female MRN: [id number]  Unit/Bed#: -01 Encounter: [id number]  Primary Care Provider: Jasmin Ely MD   Date and time admitted to hospital: 2/24/2020  6:09 PM    Acute respiratory failure with hypoxia [facility])  Assessment & Plan  · POA, evidence by 70% oxygen saturation on room air on admission, improved to 89% with 4 L  Patient was tachypneic and required BiPAP and was rapid  · Likely multifactorial   Patient influenza positive  Patchy infiltrate on chest x-ray  Vascular congestion  · Now on 3 L nasal cannula at rest  · Patient may have underlying component of COPD, though she claims she has no history of this  · Refer to pulmonology at discharge  · May have underlying oxygen needs  · Continue antitussives as patient saturates with coughing fits  · Respiratory protocol, incentive spirometry  · Azithromycin  · Could consider giving Lasix x1 for pulmonary congestion however patient is refusing due to urinary frequency at baseline      Type 2 diabetes mellitus with hyperglycemia, without long-term current use of insulin [facility])  Assessment & Plan  Lab Results   Component Value Date    HGBA1C 7 7 (H) 02/25/2020       Recent Labs     02/26/20  0529 02/26/20  0714 02/26/20  1054 02/26/20  1717   POCGLU 304* 339* 338* 310*       Blood Sugar Average: Last 72 hrs:  (P) 324 6     · Diabetic diet  · Hold glipizide while in hospital  · Fingerstick blood sugar with sliding scale coverage  · Will add 10 units subcu t i d  A c   · Check hemoglobin A1c      * COPD exacerbation (HCC)  Assessment & Plan  · Oxygen saturation 78% at triage, wheezing, tachypneic  · Notified by Radiology x-ray consistent with patchy density in the right infrahilar region suspicious for infiltrate and moderate pulmonary vascular congestion was noted as well    · Oxygen protocol  · Respiratory protocol  · Symptom control  · Will follow COPD exacerbation pathway continuing azithromycin and ceftriaxone started in the ER  · Received Solu-Medrol 125 mg IV in the ER, will continue at 40 mg IV t i d   · Encourage smoking cessation  · Patient with little improvement  Will give 1 time dose of Lasix  I ordered an echo which shows 55-60% EF, grade 1 diastolic dysfunction  Pulmonary infiltrate in right lung on CXR  Assessment & Plan  · Presents with hypoxia, fevers, shortness of breath  · Seen at urgent care prior to presentation and diagnosed with URI, did not want hospital evaluation at that time  · Fevers at home  · No leukocytosis  · Chest x-ray:  Patchy right infrahilar infiltrate, moderate pulmonary vascular congestion  · Patient is influenza positive  · No old films for comparison  · Was started on azithromycin and ceftriaxone  Will continue Zithromax in for COPD exacerbation  Will discontinue ceftriaxone for now as procalcitonin is negative x2  · Oxygen protocol  · Respiratory protocol  · Follow blood cultures    Influenza A  Assessment & Plan  · Initially presented for fever, shortness of breath  · Prior to ER was seen at urgent care where was found to be hypoxic, reportedly down to 79%  · Received Tamiflu in the ER, will continue    Thrombocytopenia ([address] )  Assessment & Plan  · Improving  Platelets 80 -> 537  · Daily CBC and trend platelets  · Monitor for bleeding    History of breast cancer  Assessment & Plan  · 2017 Upper outer left lumpectomy and radiation  · Last mammogram March 2019:  Prominent post treatment changes which are probably benign  · Continue outpatient follow-up    Essential hypertension  Assessment & Plan  · BP initially elevated  · Continue atenolol and lisinopril  · Monitor blood pressure    Morbid obesity due to excess calories (HCC)  Assessment & Plan  Morbid obesity due to excess calories with BMI of 51 24  Encourage weight loss  Dietary changes    Speech/swallow eval as patient presents concern for aspiration    VTE Pharmacologic Prophylaxis:   Pharmacologic: Pharmacologic VTE Prophylaxis contraindicated due to We are holding DVT prophylaxis for now given thrombocytopenia  Mechanical VTE Prophylaxis in Place: Yes    Patient Centered Rounds: I have performed bedside rounds with nursing staff today  Discussions with Specialists or Other Care Team Provider:  Discussed with respiratory therapy, Internal Medicine, nursing    Education and Discussions with Family / Patient:  Discussed with patient    Time Spent for Care: 30 minutes  More than 50% of total time spent on counseling and coordination of care as described above  Current Length of Stay: 2 day(s)    Current Patient Status: Inpatient   Certification Statement: The patient will continue to require additional inpatient hospital stay due to Acute respiratory failure with hypoxia    Discharge Plan:  Pending, not medically ready    Code Status: Level 1 - Full Code      Subjective:   Patient still coughing  Believes she has somewhat improved  Urinating a lot  Denies chest pain  Denies dysuria  Objective:     Vitals:   Temp (24hrs), Av 1 °F (36 7 °C), Min:97 7 °F (36 5 °C), Max:98 8 °F (37 1 °C)    Temp:  [97 7 °F (36 5 °C)-98 8 °F (37 1 °C)] 98 8 °F (37 1 °C)  HR:  [66-89] 69  Resp:  [17-20] 17  BP: ()/(52-88) 163/88  SpO2:  [82 %-97 %] 90 %  Body mass index is 51 24 kg/m²  Input and Output Summary (last 24 hours): Intake/Output Summary (Last 24 hours) at 2020 1820  Last data filed at 2020 1729  Gross per 24 hour   Intake 240 ml   Output 780 ml   Net -540 ml       Physical Exam:     Physical Exam   Constitutional: She is oriented to person, place, and time  She appears well-developed and well-nourished  No distress  HENT:   Head: Normocephalic and atraumatic  Eyes: Right eye exhibits no discharge  Left eye exhibits no discharge  No scleral icterus  Cardiovascular: Normal rate and regular rhythm  Exam reveals no gallop and no friction rub  No murmur heard    Pulmonary/Chest: Effort normal and breath sounds normal  No respiratory distress  She has no wheezes  She has no rales  Nasal cannula in place at 2 liter/minute   Abdominal: Soft  Bowel sounds are normal  She exhibits no distension  There is no tenderness  Morbidly obese abdomen   Neurological: She is alert and oriented to person, place, and time  Skin: Skin is warm and dry  Nursing note and vitals reviewed  Additional Data:     Labs:    Results from last 7 days   Lab Units 02/26/20  0541   WBC Thousand/uL 7 00   HEMOGLOBIN g/dL 13 4   HEMATOCRIT % 43 4   PLATELETS Thousands/uL 127*   NEUTROS PCT % 87*   LYMPHS PCT % 7*   MONOS PCT % 5   EOS PCT % 0     Results from last 7 days   Lab Units 02/26/20  0541  02/24/20  1835   SODIUM mmol/L 142   < > 140   POTASSIUM mmol/L 5 2   < > 4 0   CHLORIDE mmol/L 103   < > 102   CO2 mmol/L 38*   < > 35*   BUN mg/dL 19   < > 9   CREATININE mg/dL 0 74   < > 0 67   ANION GAP mmol/L 1*   < > 3*   CALCIUM mg/dL 8 2*   < > 8 2*   ALBUMIN g/dL  --   --  3 1*   TOTAL BILIRUBIN mg/dL  --   --  0 43   ALK PHOS U/L  --   --  68   ALT U/L  --   --  26   AST U/L  --   --  32   GLUCOSE RANDOM mg/dL 309*   < > 247*    < > = values in this interval not displayed  Results from last 7 days   Lab Units 02/26/20  1717 02/26/20  1054 02/26/20  0714 02/26/20  0529 02/25/20  2109 02/25/20  1551 02/25/20  1343 02/25/20  1117 02/25/20  0738 02/25/20  0021   POC GLUCOSE mg/dl 310* 338* 339* 304* 299* 321* 286* 381* 329* 339*     Results from last 7 days   Lab Units 02/25/20  0644   HEMOGLOBIN A1C % 7 7*     Results from last 7 days   Lab Units 02/25/20  0644 02/25/20  0116   PROCALCITONIN ng/ml <0 05 <0 05           * I Have Reviewed All Lab Data Listed Above  * Additional Pertinent Lab Tests Reviewed:  All Labs Within Last 24 Hours Reviewed      Recent Cultures (last 7 days):     Results from last 7 days   Lab Units 02/25/20  0913 02/25/20  0644 02/25/20  0117   BLOOD CULTURE   --  No Growth at 24 hrs  No Growth at 24 hrs  SPUTUM CULTURE  Culture too young- will reincubate  --   --    GRAM STAIN RESULT  2+ Epithelial cells per low power field*  1+ Polys*  3+ Gram positive cocci in pairs, chains and clusters*  2+ Budding yeast*  1+ Gram positive rods*  1+ Gram negative rods*  --   --        Last 24 Hours Medication List:     Current Facility-Administered Medications:  albuterol 2 5 mg Nebulization Q4H PRN ROSEANN Ring   aspirin 81 mg Oral Daily ROSEANN Ring   atenolol 50 mg Oral Daily ROSEANN Ring   azithromycin 500 mg Oral Q24H Ale Jovel PA-C   furosemide 40 mg Intravenous Once Ale Jovel PA-C   guaiFENesin 600 mg Oral BID ROSEANN Ring   insulin lispro 1-5 Units Subcutaneous TID AC Ildefonso Whitman DO   insulin lispro 10 Units Subcutaneous TID With Meals Ale Jovel PA-C   insulin lispro 2-12 Units Subcutaneous HS ROSEANN Ring   ipratropium 0 5 mg Nebulization TID ROSEANN Ring   levalbuterol 1 25 mg Nebulization TID Ashvin Wilson MD   levothyroxine 150 mcg Oral Early Morning ROSEANN Ring   lisinopril 20 mg Oral Daily ROSEANN Ring   methylPREDNISolone sodium succinate 40 mg Intravenous [facility] ROSEANN Ring   oseltamivir 75 mg Oral Q12H [address] [place], [place] [address]ROSEANN        Today, Patient Was Seen By: Wilfredo Kilpatrick PA-C    ** Please Note: Dictation voice to text software may have been used in the creation of this document   **

## 2020-02-26 NOTE — ASSESSMENT & PLAN NOTE
Morbid obesity due to excess calories with BMI of 51 24  Encourage weight loss  Dietary changes    Speech/swallow eval as patient presents concern for aspiration

## 2020-02-26 NOTE — ASSESSMENT & PLAN NOTE
· Presents with hypoxia, fevers, shortness of breath  · Seen at urgent care prior to presentation and diagnosed with URI, did not want hospital evaluation at that time  · Fevers at home  · No leukocytosis  · Chest x-ray:  Patchy right infrahilar infiltrate, moderate pulmonary vascular congestion  · Patient is influenza positive  · No old films for comparison  · Continue with Zithromax    Recheck chest x-ray  · Oxygen protocol  · Respiratory protocol  · Follow blood cultures

## 2020-02-26 NOTE — ASSESSMENT & PLAN NOTE
· Oxygen saturation 78% at triage, wheezing, tachypneic  · Notified by Radiology x-ray consistent with patchy density in the right infrahilar region suspicious for infiltrate and moderate pulmonary vascular congestion was noted as well  · Oxygen protocol  · Respiratory protocol  · Symptom control  · Will follow COPD exacerbation pathway continuing azithromycin and ceftriaxone started in the ER  · Received Solu-Medrol 125 mg IV in the ER, will continue at 40 mg IV t i d   · Encourage smoking cessation  · Patient with little improvement  Will give 1 time dose of Lasix  I ordered an echo which shows 55-60% EF, grade 1 diastolic dysfunction

## 2020-02-26 NOTE — ASSESSMENT & PLAN NOTE
Lab Results   Component Value Date    HGBA1C 7 7 (H) 02/25/2020       Recent Labs     02/25/20  2109 02/26/20  0529 02/26/20  0714 02/26/20  1054   POCGLU 299* 304* 339* 338*       Blood Sugar Average: Last 72 hrs:  (P) 326 [id number]     · Diabetic diet  · Hold glipizide while in hospital  · Fingerstick blood sugar with sliding scale coverage  · Will add 10 units subcu t i d  A c   · Check hemoglobin A1c

## 2020-02-26 NOTE — ASSESSMENT & PLAN NOTE
· POA, evidence by 70% oxygen saturation on room air on admission, improved to 89% with 4 L  Patient was tachypneic and required BiPAP and was rapid  · Likely multifactorial   Patient influenza positive  Patchy infiltrate on chest x-ray  Vascular congestion  · Now on 3 L nasal cannula at rest  · Patient may have underlying component of COPD, though she claims she has no history of this  · Refer to pulmonology at discharge  · May have underlying oxygen needs  · Continue antitussives as patient saturates with coughing fits  · Respiratory protocol, incentive spirometry    · Azithromycin  · Will give Lasix x1

## 2020-02-26 NOTE — PROGRESS NOTES
Late entry: assessment was done at 4 PM   Case discussed in rounds admit with COPD exacerbation  ? PNA  IV antibiotics/ steroids and 02  CM met with patient at the bedside,baseline information  was obtained  CM discussed the role of CM in helping the patient develop a discharge plan and assist the patient in carry out their plan  02/25/20 2030   [facility]   Patient Information   Mental Status Alert   Primary Caregiver Self   Support System Immediate family   Activities of Daily Living Prior to Admission   Functional Status Independent   [address]  (cane when outside)   Living Arrangement House;Lives alone   Ambulation Independent   Income Information   Income Source Pension/half-way   [employer] Online of Transport to Appts: Drive Self       Patient has identified:Naif Miles her sister as her primary contact  Per patient she may be able to assist her upon dc  Patients sister is her POA and she does have an advanced directive, requested that she has her sister bring in a copy for the chart  No POA: Pt verbalized ____ would be the person assisting with decisions with making if need be  No advance directive: At this time patient is not interested in receiving information  Pt has an advance directive, requested patient to bring in a copy of their AD to be scanned into the chart  PCP: Staci Vidales                Pt has a prescription plan and uses the [facility] in [place], but is in the process of changing to [facility] in [facility]  Per patient her medications are very affordable, $2 per prescription     Patient's spouse was a   Pt denies SA/MH history  No prior MH services  We discussed smoking cessation  House set up: ranch style home, with basement and 2 car garage  Functional level PTA: I/pta, manages self care  DME use: has a walker does not use, but will use a cane when outside   Pt has a nebulizer, unsure of the DME company  Prior use of Home Care or STR: hx of HHC in the past about 2010, but could not recall the agency  Transportation: pt does drive, and noted that patient drove herself to the hospital    Community Resources: no    CM discussed dc plan with patient her plan is to go home, CM discussed [facility] for follow up with COPD/PNA management, she politely declined  CM reviewed d/c planning process including the following: identifying help at home, patient preference for d/c planning needs, availability of treatment team to discuss questions or concerns patient and/or family may have regarding understanding medications and recognizing signs and symptoms once discharged  CM also encouraged patient to follow up with all recommended appointments after discharge  Patient advised of importance for patient and family to participate in managing patients medical well being  CM will continue to follow medical management  Aware CM consult was for resources, at this time no needs were identified  Current dc plan is home

## 2020-02-26 NOTE — ASSESSMENT & PLAN NOTE
· POA, evidence by 70% oxygen saturation on room air on admission, improved to 89% with 4 L  Patient was tachypneic and required BiPAP and was rapid  · Likely multifactorial   Patient influenza positive  Patchy infiltrate on chest x-ray  Vascular congestion  · Now on 3 L nasal cannula at rest  · Patient may have underlying component of COPD, though she claims she has no history of this  · Refer to pulmonology at discharge  · May have underlying oxygen needs  · Continue antitussives as patient saturates with coughing fits  · Respiratory protocol, incentive spirometry    · Azithromycin  · Could consider giving Lasix x1 for pulmonary congestion however patient is refusing due to urinary frequency at baseline

## 2020-02-27 LAB
ANION GAP SERPL CALCULATED.3IONS-SCNC: 2 MMOL/L (ref 4–13)
BACTERIA SPT RESP CULT: ABNORMAL
BACTERIA SPT RESP CULT: ABNORMAL
BASOPHILS # BLD MANUAL: 0 THOUSAND/UL (ref 0–0.1)
BASOPHILS NFR MAR MANUAL: 0 % (ref 0–1)
BUN SERPL-MCNC: 19 MG/DL (ref 5–25)
CALCIUM SERPL-MCNC: 8.3 MG/DL (ref 8.3–10.1)
CHLORIDE SERPL-SCNC: 101 MMOL/L (ref 100–108)
CO2 SERPL-SCNC: 37 MMOL/L (ref 21–32)
CREAT SERPL-MCNC: 0.66 MG/DL (ref 0.6–1.3)
EOSINOPHIL # BLD MANUAL: 0 THOUSAND/UL (ref 0–0.4)
EOSINOPHIL NFR BLD MANUAL: 0 % (ref 0–6)
ERYTHROCYTE [DISTWIDTH] IN BLOOD BY AUTOMATED COUNT: 13.2 % (ref 11.6–15.1)
GFR SERPL CREATININE-BSD FRML MDRD: 88 ML/MIN/1.73SQ M
GLUCOSE SERPL-MCNC: 250 MG/DL (ref 65–140)
GLUCOSE SERPL-MCNC: 292 MG/DL (ref 65–140)
GLUCOSE SERPL-MCNC: 301 MG/DL (ref 65–140)
GLUCOSE SERPL-MCNC: 351 MG/DL (ref 65–140)
GLUCOSE SERPL-MCNC: 80 MG/DL (ref 65–140)
GLUCOSE SERPL-MCNC: 81 MG/DL (ref 65–140)
GRAM STN SPEC: ABNORMAL
HCT VFR BLD AUTO: 41.2 % (ref 34.8–46.1)
HGB BLD-MCNC: 13 G/DL (ref 11.5–15.4)
LYMPHOCYTES # BLD AUTO: 0.45 THOUSAND/UL (ref 0.6–4.47)
LYMPHOCYTES # BLD AUTO: 6 % (ref 14–44)
MCH RBC QN AUTO: 31.4 PG (ref 26.8–34.3)
MCHC RBC AUTO-ENTMCNC: 31.6 G/DL (ref 31.4–37.4)
MCV RBC AUTO: 100 FL (ref 82–98)
MONOCYTES # BLD AUTO: 0.07 THOUSAND/UL (ref 0–1.22)
MONOCYTES NFR BLD: 1 % (ref 4–12)
NEUTROPHILS # BLD MANUAL: 6.93 THOUSAND/UL (ref 1.85–7.62)
NEUTS BAND NFR BLD MANUAL: 1 % (ref 0–8)
NEUTS SEG NFR BLD AUTO: 92 % (ref 43–75)
NRBC BLD AUTO-RTO: 0 /100 WBCS
PLATELET # BLD AUTO: 148 THOUSANDS/UL (ref 149–390)
PLATELET BLD QL SMEAR: ADEQUATE
PMV BLD AUTO: 9 FL (ref 8.9–12.7)
POTASSIUM SERPL-SCNC: 4.6 MMOL/L (ref 3.5–5.3)
PROCALCITONIN SERPL-MCNC: <0.05 NG/ML
RBC # BLD AUTO: 4.14 MILLION/UL (ref 3.81–5.12)
RBC MORPH BLD: NORMAL
SODIUM SERPL-SCNC: 140 MMOL/L (ref 136–145)
TOTAL CELLS COUNTED SPEC: 100
WBC # BLD AUTO: 7.45 THOUSAND/UL (ref 4.31–10.16)

## 2020-02-27 PROCEDURE — 97163 PT EVAL HIGH COMPLEX 45 MIN: CPT

## 2020-02-27 PROCEDURE — 85027 COMPLETE CBC AUTOMATED: CPT | Performed by: PHYSICIAN ASSISTANT

## 2020-02-27 PROCEDURE — 94640 AIRWAY INHALATION TREATMENT: CPT

## 2020-02-27 PROCEDURE — 80048 BASIC METABOLIC PNL TOTAL CA: CPT | Performed by: PHYSICIAN ASSISTANT

## 2020-02-27 PROCEDURE — 82948 REAGENT STRIP/BLOOD GLUCOSE: CPT

## 2020-02-27 PROCEDURE — 85007 BL SMEAR W/DIFF WBC COUNT: CPT | Performed by: PHYSICIAN ASSISTANT

## 2020-02-27 PROCEDURE — 94760 N-INVAS EAR/PLS OXIMETRY 1: CPT

## 2020-02-27 PROCEDURE — 99232 SBSQ HOSP IP/OBS MODERATE 35: CPT | Performed by: INTERNAL MEDICINE

## 2020-02-27 PROCEDURE — 97166 OT EVAL MOD COMPLEX 45 MIN: CPT

## 2020-02-27 PROCEDURE — 84145 PROCALCITONIN (PCT): CPT | Performed by: PHYSICIAN ASSISTANT

## 2020-02-27 RX ORDER — METHYLPREDNISOLONE SODIUM SUCCINATE 40 MG/ML
40 INJECTION, POWDER, LYOPHILIZED, FOR SOLUTION INTRAMUSCULAR; INTRAVENOUS DAILY
Status: DISCONTINUED | OUTPATIENT
Start: 2020-02-28 | End: 2020-02-28 | Stop reason: HOSPADM

## 2020-02-27 RX ADMIN — IPRATROPIUM BROMIDE 0.5 MG: 0.5 SOLUTION RESPIRATORY (INHALATION) at 13:46

## 2020-02-27 RX ADMIN — LISINOPRIL 20 MG: 20 TABLET ORAL at 09:47

## 2020-02-27 RX ADMIN — INSULIN LISPRO 10 UNITS: 100 INJECTION, SOLUTION INTRAVENOUS; SUBCUTANEOUS at 09:38

## 2020-02-27 RX ADMIN — OSELTAMIVIR PHOSPHATE 75 MG: 75 CAPSULE ORAL at 22:32

## 2020-02-27 RX ADMIN — LEVALBUTEROL HYDROCHLORIDE 1.25 MG: 1.25 SOLUTION, CONCENTRATE RESPIRATORY (INHALATION) at 07:23

## 2020-02-27 RX ADMIN — METHYLPREDNISOLONE SODIUM SUCCINATE 40 MG: 40 INJECTION, POWDER, FOR SOLUTION INTRAMUSCULAR; INTRAVENOUS at 05:40

## 2020-02-27 RX ADMIN — INSULIN LISPRO 10 UNITS: 100 INJECTION, SOLUTION INTRAVENOUS; SUBCUTANEOUS at 12:00

## 2020-02-27 RX ADMIN — ASPIRIN 81 MG: 81 TABLET, COATED ORAL at 09:47

## 2020-02-27 RX ADMIN — KETOROLAC TROMETHAMINE 1 DROP: 5 SOLUTION OPHTHALMIC at 22:33

## 2020-02-27 RX ADMIN — LEVALBUTEROL HYDROCHLORIDE 1.25 MG: 1.25 SOLUTION, CONCENTRATE RESPIRATORY (INHALATION) at 13:46

## 2020-02-27 RX ADMIN — ATENOLOL 50 MG: 50 TABLET ORAL at 09:48

## 2020-02-27 RX ADMIN — INSULIN LISPRO 2 UNITS: 100 INJECTION, SOLUTION INTRAVENOUS; SUBCUTANEOUS at 17:14

## 2020-02-27 RX ADMIN — INSULIN LISPRO 10 UNITS: 100 INJECTION, SOLUTION INTRAVENOUS; SUBCUTANEOUS at 17:14

## 2020-02-27 RX ADMIN — INSULIN LISPRO 3 UNITS: 100 INJECTION, SOLUTION INTRAVENOUS; SUBCUTANEOUS at 12:00

## 2020-02-27 RX ADMIN — GUAIFENESIN 600 MG: 600 TABLET ORAL at 09:48

## 2020-02-27 RX ADMIN — LEVALBUTEROL HYDROCHLORIDE 1.25 MG: 1.25 SOLUTION, CONCENTRATE RESPIRATORY (INHALATION) at 19:27

## 2020-02-27 RX ADMIN — AZITHROMYCIN 500 MG: 250 TABLET, FILM COATED ORAL at 19:12

## 2020-02-27 RX ADMIN — PREDNISOLONE ACETATE 1 DROP: 10 SUSPENSION/ DROPS OPHTHALMIC at 22:33

## 2020-02-27 RX ADMIN — LEVOTHYROXINE SODIUM 150 MCG: 150 TABLET ORAL at 05:40

## 2020-02-27 RX ADMIN — INSULIN LISPRO 2 UNITS: 100 INJECTION, SOLUTION INTRAVENOUS; SUBCUTANEOUS at 09:38

## 2020-02-27 RX ADMIN — IPRATROPIUM BROMIDE 0.5 MG: 0.5 SOLUTION RESPIRATORY (INHALATION) at 19:27

## 2020-02-27 RX ADMIN — OSELTAMIVIR PHOSPHATE 75 MG: 75 CAPSULE ORAL at 09:48

## 2020-02-27 RX ADMIN — IPRATROPIUM BROMIDE 0.5 MG: 0.5 SOLUTION RESPIRATORY (INHALATION) at 07:23

## 2020-02-27 NOTE — NURSING NOTE
Notified Dr Christelle Torres,  removes oxygen while eating oxygen saturation drops to 84 to 85% , reviewed with patient to keep oxygen in while eating , uncooperative

## 2020-02-27 NOTE — RESPIRATORY THERAPY NOTE
Home Oxygen Qualifying Test       Patient name: Fara Olivares        : 1945   Date of Test:  2020  Diagnosis:      Home Oxygen Test:    **Medicare Guidelines require item(s) 1-5 on all ambulatory patients or 1 and 2 on non-ambulatory patients  1   Baseline SPO2 on Room Air at rest 76 %  2   SPO2 during exercise on Room Air NA %  During exercise monitor SpO2  If SPO2 increases >=89% with ambulation do not add supplemental             oxygen  If <= 88% on room air add O2 via NC and titrate patient  Patient must be ambulated with O2 and titrated to > 88% with exertion  3   SPO2 on Oxygen at rest 92 % 3 lpm     4   SPO2 during exercise on Oxygen  89% a liter flow of 3 lpm     5   Exercise performed:          walking, distance 50 (feet)          [x]  Supplemental Home Oxygen is indicated  []  Client does not qualify for home oxygen  Respiratory Additional Notes- Pt was blowing nose on arrival to room with 3LPM NC not in place  On RA pt SpO2 76%  When NC placed in Nares pt SpO2 92% sitting  During exercise pt SpO2 89% on 3LPM NC     Brenda Byrd, RT

## 2020-02-27 NOTE — PROGRESS NOTES
Progress Note - Meeta Vazquez 1945, 69 y o  female MRN: [id number]    Unit/Bed#: -01 Encounter: [id number]    Primary Care Provider: Kayla Lyons MD   Date and time admitted to hospital: 2/24/2020  6:09 PM        * Acute respiratory failure with hypoxia ([address] )  Assessment & Plan  · POA, evidence by 70% oxygen saturation on room air on admission, improved to 89% with 4 L  Patient was tachypneic and required BiPAP and was rapid  · Likely multifactorial   Patient influenza positive  Patchy infiltrate on chest x-ray  Vascular congestion  · Now on 3 L nasal cannula at rest  · Patient may have underlying component of COPD, though she claims she has no history of this  · Refer to pulmonology at discharge  · May have underlying oxygen needs  · Continue antitussives as patient saturates with coughing fits  · Respiratory protocol, incentive spirometry  · Azithromycin  · Could consider giving Lasix x1 for pulmonary congestion however patient is refusing due to urinary frequency at baseline      Morbid obesity due to excess calories [facility])  Assessment & Plan  Morbid obesity due to excess calories with BMI of 51 24  Encourage weight loss  Dietary changes  Speech/swallow eval as patient presents concern for aspiration    Thrombocytopenia (HCC)  Assessment & Plan  · Improving    Platelets 80 -> 695  · Daily CBC and trend platelets  · Monitor for bleeding    History of breast cancer  Assessment & Plan  · 2017 [place] left lumpectomy and radiation  · Last mammogram March 2019:  Prominent post treatment changes which are probably benign  · Continue outpatient follow-up    Pulmonary infiltrate in right lung on CXR  Assessment & Plan  · Presents with hypoxia, fevers, shortness of breath  · Seen at urgent care prior to presentation and diagnosed with URI, did not want hospital evaluation at that time  · Fevers at home  · No leukocytosis  · Chest x-ray:  Patchy right infrahilar infiltrate, moderate pulmonary vascular congestion  · Patient is influenza positive  · No old films for comparison  · Continue with Zithromax  Recheck chest x-ray  · Oxygen protocol  · Respiratory protocol  · Follow blood cultures    Essential hypertension  Assessment & Plan  · BP initially elevated  · Continue atenolol and lisinopril  · Monitor blood pressure    Type 2 diabetes mellitus with hyperglycemia, without long-term current use of insulin [facility])  Assessment & Plan  Lab Results   Component Value Date    HGBA1C 7 7 (H) 2020       Recent Labs     20  0529 20  0714 20  1054 20  1717   POCGLU 304* 339* 338* 310*       Blood Sugar Average: Last 72 hrs:  (P) 324 6     · Diabetic diet  · Hold glipizide while in hospital  · Fingerstick blood sugar with sliding scale coverage  ·  10 units subcu t i d  A c ---inc to 15  · Check hemoglobin A1c      Influenza A  Assessment & Plan  · Initially presented for fever, shortness of breath  · Prior to ER was seen at urgent care where was found to be hypoxic, reportedly down to 79%  · Received Tamiflu in the ER, will continue        Patient Centered Rounds: I have performed bedside rounds with nursing staff today  Time Spent for Care: 15 minutes  More than 50% of total time spent on counseling and coordination of care as described above  Current Length of Stay: 3 day(s)    Current Patient Status: Inpatient   Certification Statement: The patient will continue to require additional inpatient hospital stay due to Need to monitor symptom      Code Status: Level 1 - Full Code      Subjective:   Shortness of breath    Objective:     Vitals:   Temp (24hrs), Av 2 °F (36 8 °C), Min:97 5 °F (36 4 °C), Max:98 8 °F (37 1 °C)    Temp:  [97 5 °F (36 4 °C)-98 8 °F (37 1 °C)] 97 5 °F (36 4 °C)  HR:  [62-89] 75  Resp:  [17-21] 18  BP: ()/(52-88) 168/88  SpO2:  [82 %-97 %] 94 %  Body mass index is 51 24 kg/m²       Input and Output Summary (last 24 hours): Intake/Output Summary (Last 24 hours) at 2/27/2020 5055  Last data filed at 2/26/2020 1845  Gross per 24 hour   Intake 640 ml   Output 780 ml   Net -140 ml       Physical Exam:     Physical Exam   Constitutional: She is oriented to person, place, and time  HENT:   Head: Normocephalic  Pulmonary/Chest: She has wheezes  Abdominal: Soft  Bowel sounds are normal    Neurological: She is alert and oriented to person, place, and time  Skin: Skin is warm  Additional Data:     Labs:    Results from last 7 days   Lab Units 02/27/20  0516 02/26/20  0541   WBC Thousand/uL 7 45 7 00   HEMOGLOBIN g/dL 13 0 13 4   HEMATOCRIT % 41 2 43 4   PLATELETS Thousands/uL 148* 127*   NEUTROS PCT %  --  87*   LYMPHS PCT %  --  7*   LYMPHO PCT % 6*  --    MONOS PCT %  --  5   MONO PCT % 1*  --    EOS PCT % 0 0     Results from last 7 days   Lab Units 02/27/20  0516  02/24/20  1835   POTASSIUM mmol/L 4 6   < > 4 0   CHLORIDE mmol/L 101   < > 102   CO2 mmol/L 37*   < > 35*   BUN mg/dL 19   < > 9   CREATININE mg/dL 0 66   < > 0 67   CALCIUM mg/dL 8 3   < > 8 2*   ALK PHOS U/L  --   --  68   ALT U/L  --   --  26   AST U/L  --   --  32    < > = values in this interval not displayed  * I Have Reviewed All Lab Data Listed Above  * Additional Pertinent Lab Tests Reviewed: All Labs Within Last 24 Hours Reviewed        Recent Cultures (last 7 days):     Results from last 7 days   Lab Units 02/25/20  0913 02/25/20  0644 02/25/20  0117   BLOOD CULTURE   --  No Growth at 24 hrs  No Growth at 24 hrs     SPUTUM CULTURE  Culture too young- will reincubate  --   --    GRAM STAIN RESULT  2+ Epithelial cells per low power field*  1+ Polys*  3+ Gram positive cocci in pairs, chains and clusters*  2+ Budding yeast*  1+ Gram positive rods*  1+ Gram negative rods*  --   --        Last 24 Hours Medication List:     Current Facility-Administered Medications:  albuterol 2 5 mg Nebulization Q4H PRN ROSEANN Perrin   aspirin 81 mg Oral Daily Eligha Moment, CRNP   atenolol 50 mg Oral Daily Eligha Moment, CRNP   azithromycin 500 mg Oral Q24H Ale Jovel PA-C   guaiFENesin 600 mg Oral BID Eligha Moment, CRHENRIETTA   insulin lispro 1-5 Units Subcutaneous TID AC Ildefonso Whitman DO   insulin lispro 10 Units Subcutaneous TID With Meals Ale Jovel PA-C   insulin lispro 2-12 Units Subcutaneous HS Eligha Moment, CRHENRIETTA   ipratropium 0 5 mg Nebulization TID Eligha Moment, CRNP   ketorolac 1 drop Right Eye 4x Daily Katheryn Munozx, CRHENRIETTA   levalbuterol 1 25 mg Nebulization TID Gypsy Leigh MD   levothyroxine 150 mcg Oral Early Morning Eligha Moment, CRHENRIETTA   lisinopril 20 mg Oral Daily Eligha Moment, CRNP   methylPREDNISolone sodium succinate 40 mg Intravenous [facility] Eligha Moment, CRNP   oseltamivir 75 mg Oral Q12H [facility], ROSEANN   prednisoLONE acetate 1 drop Right Eye 4x Daily Katheryn Mitchell, ROSEANN        Today, Patient Was Seen By: Albert Rodriguez DO    ** Please Note: Dictation voice to text software may have been used in the creation of this document   **

## 2020-02-27 NOTE — NURSING NOTE
Resp therapist stated earlier that pt was upset regarding diagnosis in chart of COPD  In room to follow up patient's concern  She stated " I was tested by my family MD and was told that I do not have COPD  My doctor will be upset that this is in my chart  I want it removed  Noone did testing on me here so how do they know I have COPD  My brother has COPD and is dying from it, I would know it" MD made aware of same  Medical records called and made aware, follow up to be done by MR  Pt made aware of same

## 2020-02-27 NOTE — RESPIRATORY THERAPY NOTE
RT Protocol Note  Stacia Garsia 69 y o  female MRN: [id number]  Unit/Bed#: -01 Encounter: [id number]    Assessment    Principal Problem:    Acute respiratory failure with hypoxia ([place] [address] )  Active Problems:    Influenza A    Type 2 diabetes mellitus with hyperglycemia, without long-term current use of insulin (HCC)    Essential hypertension    Pulmonary infiltrate in right lung on CXR    History of breast cancer    COPD exacerbation (HCC)    Thrombocytopenia ([place] [address] )    Morbid obesity due to excess calories [facility])      Home Pulmonary Medications:         Past Medical History:   Diagnosis Date    Breast cancer ([place] [address] )     Diabetes mellitus ([place] [address] )     Hypertension      Social History     Socioeconomic History    Marital status:       Spouse name: None    Number of children: None    Years of education: None    Highest education level: None   Occupational History    None   Social Needs    Financial resource strain: None    Food insecurity:     Worry: None     Inability: None    Transportation needs:     Medical: None     Non-medical: None   Tobacco Use    Smoking status: Current Every Day Smoker     Packs/day: 0 25     Types: Cigarettes    Smokeless tobacco: Never Used   Substance and Sexual Activity    Alcohol use: Never     Frequency: Never    Drug use: Yes     Types: Marijuana    Sexual activity: None   Lifestyle    Physical activity:     Days per week: None     Minutes per session: None    Stress: None   Relationships    Social connections:     Talks on phone: None     Gets together: None     Attends [employer] service: None     Active member of club or organization: None     Attends meetings of clubs or organizations: None     Relationship status: None    Intimate partner violence:     Fear of current or ex partner: None     Emotionally abused: None     Physically abused: None     Forced sexual activity: None   Other Topics Concern    None   Social History Narrative    None Subjective    Subjective Data: patient is current everyday smoker with COPD   She uses albuterol as needed for management of shortness of breath    Objective    Physical Exam:   Assessment Type: During-treatment  General Appearance: Alert, Awake  Respiratory Pattern: Dyspnea with exertion  Chest Assessment: Chest expansion symmetrical  Bilateral Breath Sounds: Diminished  Cough: Non-productive  O2 Device: 3LPM NC    Vitals:  Blood pressure 168/88, pulse 75, temperature 97 5 °F (36 4 °C), resp  rate 18, height 5' 3" (1 6 m), weight 131 kg (289 lb 3 9 oz), SpO2 94 %  Imaging and other studies: I have personally reviewed pertinent reports  O2 Device: 3LPM NC     Plan    Respiratory Plan: Home Bronchodilator Patient pathway        Resp Comments: (P) Explained to pt home O2 eval was ordered  Explained details of order  Pt does not want to go for a walk right now  Pt wants to have her tea and watch TV  Asked pt if I could come back in 15min  Pt agreed  Will come back at 0930

## 2020-02-27 NOTE — PLAN OF CARE
Problem: PHYSICAL THERAPY ADULT  Goal: Performs mobility at highest level of function for planned discharge setting  See evaluation for individualized goals  Description  Treatment/Interventions: Functional transfer training, LE strengthening/ROM, Elevations, Therapeutic exercise, Endurance training, Equipment eval/education, Bed mobility, Gait training, Compensatory technique education, Spoke to nursing, OT  Equipment Recommended: (none)       See flowsheet documentation for full assessment, interventions and recommendations  Note:   Prognosis: Good  Problem List: Decreased strength, Decreased endurance, Impaired balance, Decreased mobility, Obesity, Decreased safety awareness  Assessment: Pt is a 69 y o  female seen for PT evaluation s/p admission to [address] [place] [address] on 2/24/2020 with Acute respiratory failure with hypoxia ([facility] )  Order placed for PT services  Upon evaluation: Pt is presenting with impaired functional mobility due to decreased strength, decreased endurance, impaired balance, gait deviations, decreased safety awareness, impaired judgment, fall risk, LE edema and impaired skin integrity requiring supervision assistance for transfers and supervision assistance for ambulation with out AD  Pt's clinical presentation is currently unpredictable given the functional mobility deficits above, especially weakness, edema of extremities, decreased skin integrity, decreased endurance, gait deviations, decreased activity tolerance, decreased functional mobility tolerance, decreased safety awareness and SOB upon exertion, coupled with fall risks as indicated by AM-PAC 6-Clicks: 90/31 as well as impaired balance, polypharmacy, impaired judgement and decreased safety awareness and combined with medical complications of abnormal blood sugars, low SpO2 values and abnormal CO2 values    Pt's PMHx and comorbidities that may affect physical performance and progress include: COPD, DM, HTN, obesity and cancer history and/or treatment  Personal factors affecting pt at time of IE include: step(s) to enter environment, limited home support and inability to navigate community distances  Pt will benefit from continued skilled PT services to address deficits as defined above and to maximize level of functional mobility to facilitate return toward PLOF and improved QOL  From PT/mobility standpoint, recommendation at time of d/c would be anticipate no needs vs home PT pending progress in order to reduce fall risk and maximize pt's functional independence and consistency with mobility in order to facilitate return to PLOF  Recommend ther ex next 1-2 sessions  Recommendation: (home PT vs no needs)          See flowsheet documentation for full assessment

## 2020-02-27 NOTE — OCCUPATIONAL THERAPY NOTE
Occupational Therapy Evaluation     Patient Name: Freya Cunha  [id number] Date: 2/27/2020  Problem List  Principal Problem:    Acute respiratory failure with hypoxia ([facility] )  Active Problems:    Influenza A    Type 2 diabetes mellitus with hyperglycemia, without long-term current use of insulin (HCC)    Essential hypertension    Pulmonary infiltrate in right lung on CXR    History of breast cancer    COPD exacerbation (HCC)    Thrombocytopenia ([facility] )    Morbid obesity due to excess calories [facility])    Past Medical History  Past Medical History:   Diagnosis Date    Breast cancer ([facility] )     Diabetes mellitus ([facility] )     Hypertension      Past Surgical History  Past Surgical History:   Procedure Laterality Date    BREAST SURGERY      left lumpectomy    CARPAL TUNNEL RELEASE      CATARACT EXTRACTION      JOINT REPLACEMENT             02/27/20 1037   Note Type   Note type Eval only   Restrictions/Precautions   Other Precautions O2;Fall Risk   Pain Assessment   Pain Assessment No/denies pain   Pain Score No Pain   Home Living   Type of [address] [place] [address] One level; Laundry in basement  (2 SANJUANITA B HR  FF to basement L HR )   Bathroom Shower/Tub Tub/shower unit   [employer] Raised   Bathroom Equipment Grab bars in shower; Shower chair   [address]  (uses PRN)   Additional Comments Pt reports living in a ranch style home with 2 SANJUANITA B HR  laundry in basement with FF L HR    Pt lives alone   Prior Function   Level of [place] Independent with ADLs and functional mobility   Lives With Alone   ADL Assistance Independent   IADLs Independent  (cooking, cleaning, driving)   Falls in the last 6 months 0   Comments Pt reports completing all ADLs, IADLs, functional ambulation, community mobility + driving   Lifestyle   Autonomy Pt reports completing ADLs and all functional tasks @ I   Reciprocal Relationships Pt lives alone but has a sister who lives close by   Service to Others Pt is currently retired Intrinsic Gratification Pt enjoys reading on her Irene   ADL   Eating Assistance 6  Modified independent   Grooming Assistance 6  Modified Independent   UB Bathing Assistance 6  Modified Independent   LB Bathing Assistance 6  Modified Independent   UB Dressing Assistance 6  Modified independent   [facility] 4  Minimal Assistance   LB Dressing Deficit Thread RLE into underwear; Thread LLE into underwear   Additional Comments Pt reports completing LB ADLs tasks at home with use of AD  AD not present at time of eval and Pt requiring Min A for donning underwear  Transfers   Sit to Stand 6  Modified independent   Stand to Sit 6  Modified independent   Stand pivot 6  Modified independent   Additional Comments extended time PRN  no AD, Pt currenlty on 3L of O2 and requires rest breaks PRN  Balance   Static Sitting Good   Dynamic Sitting Fair +   Static Standing Fair +   Dynamic Standing Fair   Activity Tolerance   Activity Tolerance Patient limited by fatigue   Medical Staff Made Aware Spoke with PT, Eusebio Atkins   Nurse Made Aware Spoke with RN   RUE Assessment   RUE Assessment WFL   LUE Assessment   LUE Assessment WFL   Hand Function   Gross Motor Coordination Functional   Fine Motor Coordination Functional   Cognition   Overall Cognitive Status WFL   Arousal/Participation Alert; Responsive; Cooperative   Attention Within functional limits   Orientation Level Oriented X4   Memory Within functional limits   Following Commands Follows all commands and directions without difficulty   Assessment   Limitation Decreased endurance   Prognosis Good   Assessment Pt is a 70 y/o F admitted to [address] [place] [address] 2/24/2020 d/t experiencing increase SOB and fever  Dx: acute respiratory failure with hypoxia  Pt with PMHx impacting performance during functional tasks including: COPD, HTN, DM II, Hx L breast CA, current smoker  Pt reports living in a 1 story rancher style home with 2 SANJUANITA B HR with laundry in basement FF R HR   Pt reports "I tend to be lazy with doing my laundry, I do it maybe 1-2x/month"  Pt reports completing all ADLs, IADLs, functional mobility, community mobility + driving @ I with no AD  Pt reports she has a cane at home and uses PRN  For long distance ambulation (shopping at [facility]) pt utilizes electric scooter  Pt lives alone but has support from her sister who lives close by  On evaluation, Pt is A&Ox4  Pt completing functional transfers @ Mod I for extended time and rest breaks PRN, Pt is currently on 3L of O2 (Pt is not on supplemental O2 at home PTA)  Pt completing UB ADLs @ Mod I  LB ADLs @ Min A for threading BLE through underwear, Pt reports "I can do it at home with my equipment that I got when I had my hip replaced " Pt is adamant that she is performing at [facility] at this time and does not require any further OT services  Pt is comfortable with using AD and does not wish to review with therapist  Pt scoring 75/100 on Barthel Index  Pt presents with decrease activity tolerance and decrease generalized strength  Pt does not require any further OT services at this time d/t performing ADLs and functional tasks at [facility]  D/c OT services effective 2/27/2020  If new concerns arise, please re-consult  Plan   OT Frequency Eval only   Recommendation   Recommendation   (home with family assistance for laundry in basement PRN)   OT Discharge Recommendation   (home with family assistance for laundry in basement PRN)   Barthel Index   Feeding 10   Bathing 5   Grooming Score 5   Dressing Score 10   Bladder Score 10   Bowels Score 10   Toilet Use Score 10   Transfers (Bed/Chair) Score 15   Mobility (Level Surface) Score 0   Stairs Score 0   Barthel Index Score 75        Pt presents with decrease activity tolerance and decrease generalized strength  Pt does not require any further OT services at this time d/t performing ADLs and functional tasks at [facility]  D/c OT services effective 2/27/2020   If new concerns arise, please re-consult      Serenity Bhat, OTR/L

## 2020-02-27 NOTE — PHYSICAL THERAPY NOTE
PHYSICAL THERAPY EVALUATION  NAME:  Tyshawn York  DATE: 02/27/20    AGE:   69 y o  Mrn:   [id number]  ADMIT DX:  SOB (shortness of breath) [R06 02]  Influenza A [J10 1]  Hypoxia [R09 02]  COPD exacerbation (HCC) [J44 1]  Pneumonia of right middle lobe due to infectious organism ([facility] ) [J18 1]    Past Medical History:   Diagnosis Date    Breast cancer ([facility] )     Diabetes mellitus ([facility] )     Hypertension      Length Of Stay: 3  Performed at least 2 patient identifiers during session: Name and Birthday  PHYSICAL THERAPY EVALUATION :      02/27/20 1038   Note Type   Note type Eval only   Pain Assessment   Pain Assessment No/denies pain   Pain Score No Pain   Home Living   Type of [address] [place] [address] One level  (2 SANJUANITA HR and FF to basement (uses for laundry only))   Bathroom Shower/Tub Tub/shower unit   [employer] Raised   Bathroom Equipment Grab bars in shower; Shower chair   [address] [facility]  (doesn't use all the time)   Prior Function   Level of [place] Independent with ADLs and functional mobility   Lives With Alone   ADL Assistance Independent   IADLs Independent  (cooking, cleaning, driving)   Falls in the last 6 months 0   Comments L HR on stairs to basement  Reports going down baqckward with B hands on R HR to descend   Restrictions/Precautions   Other Precautions O2;Droplet precautions; Fall Risk   General   Additional Pertinent History + Influenza A   Cognition   Orientation Level Oriented X4   Following Commands Follows all commands and directions without difficulty   RLE Assessment   RLE Assessment WFL  (strength 4/5)   LLE Assessment   LLE Assessment WFL  (strength 4/5)   Coordination   Movements are Fluid and Coordinated 1   Light Touch   RLE Light Touch Grossly intact   LLE Light Touch Grossly intact   Bed Mobility   Additional Comments Pt sitting OOB in recliner chair at strt and end of session with all needs within reach      Transfers   Sit to Stand 7  Independent   Stand to Sit 7  Independent   Stand pivot 5  Supervision   Additional items Verbal cues  (cues for O2 use)   Additional Comments completed transfer and ambulation without AD   Ambulation/Elevation   Gait pattern Wide LORNA   Gait Assistance 5  Supervision   Assistive Device None   Distance 84'   Stair Management Assistance   (pt declined)   Balance   Static Sitting Normal   Dynamic Sitting Good   Static Standing Fair +   Dynamic Standing Fair   Ambulatory Fair   Endurance Deficit   Endurance Deficit Yes   Endurance Deficit Description SpO2 on 3 lpm at rest 92-93% and desaturated to 89% after ambulation  Verbal cues for pursed lip breathing to recover to 92%  Activity Tolerance   Activity Tolerance Patient limited by fatigue   Medical Staff Made Aware [place]   Assessment   Prognosis Good   Problem List Decreased strength;Decreased endurance; Impaired balance;Decreased mobility;Obesity; Decreased safety awareness   Goals   Patient Goals "Go home"   STG Expiration Date 03/08/20   PT Treatment Day 0   Plan   Treatment/Interventions Functional transfer training;LE strengthening/ROM; Elevations; Therapeutic exercise; Endurance training;Equipment eval/education; Bed mobility;Gait training; Compensatory technique education;Spoke to nursing;OT   PT Frequency Other (Comment)  (3-5x/week)   Recommendation   Recommendation   (home PT vs no needs)   Equipment Recommended   (none)   Additional Comments [facility] 6 clicks 01/75     (Please find full objective findings from PT assessment regarding body systems outlined above)  Assessment: Pt is a 69 y o  female seen for PT evaluation s/p admission to [address] [place] [address] on 2/24/2020 with Acute respiratory failure with hypoxia ([facility] )  Order placed for PT services    Upon evaluation: Pt is presenting with impaired functional mobility due to decreased strength, decreased endurance, impaired balance, gait deviations, decreased safety awareness, impaired judgment, fall risk, LE edema and impaired skin integrity requiring supervision assistance for transfers and supervision assistance for ambulation with out AD  Pt's clinical presentation is currently unpredictable given the functional mobility deficits above, especially weakness, edema of extremities, decreased skin integrity, decreased endurance, gait deviations, decreased activity tolerance, decreased functional mobility tolerance, decreased safety awareness and SOB upon exertion, coupled with fall risks as indicated by AM-PAC 6-Clicks: 09/27 as well as impaired balance, polypharmacy, impaired judgement and decreased safety awareness and combined with medical complications of abnormal blood sugars, low SpO2 values and abnormal CO2 values  Pt's PMHx and comorbidities that may affect physical performance and progress include: COPD, DM, HTN, obesity and cancer history and/or treatment  Personal factors affecting pt at time of IE include: step(s) to enter environment, limited home support and inability to navigate community distances  Pt will benefit from continued skilled PT services to address deficits as defined above and to maximize level of functional mobility to facilitate return toward PLOF and improved QOL  From PT/mobility standpoint, recommendation at time of d/c would be anticipate no needs vs home PT pending progress in order to reduce fall risk and maximize pt's functional independence and consistency with mobility in order to facilitate return to PLOF  Recommend ther ex next 1-2 sessions  Goals: Pt will: Perform bed mobility tasks with modified I to reposition in bed and prepare for transfers  Pt will perform spt transfers with independent to increase Indep in home environment and prepare for ambulation   Pt will ambulate with least restrictive AD for >/= 200' with  modified I  to increase Indep in home environment, return to home with SANJUANITA, decrease risk for falls, improve activity tolerance and improve gait quality and to access home environment  Pt will complete >/= 12 steps with with unilateral handrail with modified I to return to home with SANJUANITA, return to multilevel home and decrease risk for falls          Manuel Aponte, PT,DPT

## 2020-02-28 VITALS
WEIGHT: 280.65 LBS | BODY MASS INDEX: 49.73 KG/M2 | TEMPERATURE: 97.6 F | DIASTOLIC BLOOD PRESSURE: 69 MMHG | OXYGEN SATURATION: 93 % | RESPIRATION RATE: 18 BRPM | HEIGHT: 63 IN | SYSTOLIC BLOOD PRESSURE: 141 MMHG | HEART RATE: 63 BPM

## 2020-02-28 LAB — GLUCOSE SERPL-MCNC: 93 MG/DL (ref 65–140)

## 2020-02-28 PROCEDURE — 82948 REAGENT STRIP/BLOOD GLUCOSE: CPT

## 2020-02-28 PROCEDURE — 94640 AIRWAY INHALATION TREATMENT: CPT

## 2020-02-28 PROCEDURE — 99238 HOSP IP/OBS DSCHRG MGMT 30/<: CPT | Performed by: INTERNAL MEDICINE

## 2020-02-28 PROCEDURE — 92610 EVALUATE SWALLOWING FUNCTION: CPT

## 2020-02-28 PROCEDURE — 94760 N-INVAS EAR/PLS OXIMETRY 1: CPT

## 2020-02-28 RX ORDER — PREDNISONE 10 MG/1
40 TABLET ORAL DAILY
Qty: 30 TABLET | Refills: 0 | Status: SHIPPED | OUTPATIENT
Start: 2020-02-28 | End: 2020-03-02

## 2020-02-28 RX ORDER — OSELTAMIVIR PHOSPHATE 75 MG/1
75 CAPSULE ORAL EVERY 12 HOURS SCHEDULED
Qty: 4 CAPSULE | Refills: 0 | Status: SHIPPED | OUTPATIENT
Start: 2020-02-28 | End: 2020-03-01

## 2020-02-28 RX ADMIN — INSULIN LISPRO 10 UNITS: 100 INJECTION, SOLUTION INTRAVENOUS; SUBCUTANEOUS at 08:03

## 2020-02-28 RX ADMIN — OSELTAMIVIR PHOSPHATE 75 MG: 75 CAPSULE ORAL at 08:00

## 2020-02-28 RX ADMIN — LEVOTHYROXINE SODIUM 150 MCG: 150 TABLET ORAL at 05:50

## 2020-02-28 RX ADMIN — ATENOLOL 50 MG: 50 TABLET ORAL at 08:00

## 2020-02-28 RX ADMIN — LEVALBUTEROL HYDROCHLORIDE 1.25 MG: 1.25 SOLUTION, CONCENTRATE RESPIRATORY (INHALATION) at 07:11

## 2020-02-28 RX ADMIN — IPRATROPIUM BROMIDE 0.5 MG: 0.5 SOLUTION RESPIRATORY (INHALATION) at 07:11

## 2020-02-28 RX ADMIN — LISINOPRIL 20 MG: 20 TABLET ORAL at 08:00

## 2020-02-28 RX ADMIN — METHYLPREDNISOLONE SODIUM SUCCINATE 40 MG: 40 INJECTION, POWDER, FOR SOLUTION INTRAMUSCULAR; INTRAVENOUS at 08:35

## 2020-02-28 RX ADMIN — ASPIRIN 81 MG: 81 TABLET, COATED ORAL at 08:00

## 2020-02-28 NOTE — PROGRESS NOTES
Upon entering patients room, patient requesting to sign out [facility] at bedside  After extensively  educating patient on the risks of signing out [facility] patient still requesting to  Patient also refused tobacco cessation education  Paperwork signed, IV removed, and patient escorted out via wheelchair

## 2020-02-28 NOTE — ASSESSMENT & PLAN NOTE
· POA, evidence by 70% oxygen saturation on room air on admission, improved to 89% with 4 L  Patient was tachypneic and required BiPAP and was rapid  · Likely multifactorial   Patient influenza positive  Patchy infiltrate on chest x-ray  Vascular congestion  · Now on 3 L nasal cannula at rest  · Patient may have underlying component of COPD, though she claims she has no history of this  She does that she was previously tested and downtown  · Refer to pulmonology at discharge with follow-up with PCP  · Patient is leaving against medical advice

## 2020-02-28 NOTE — NURSING NOTE
Notified Ama Johnson of pharmacy,Per patient request does not want our eye drops provided , Has same eye drops from home (ketorolac & prednisolone)  Patient states "I have prescription plan at home I only pay $2 00 a month and do not want to be billed for new  Eye drops " per Ama Johnson eye drops not scanned or opened patient will not be billed for our eye drops provided and will bar code patient's eye drops from home

## 2020-02-28 NOTE — PLAN OF CARE
Problem: Potential for Falls  Goal: Patient will remain free of falls  Description  INTERVENTIONS:  - Assess patient frequently for physical needs  -  Identify cognitive and physical deficits and behaviors that affect risk of falls  -  [place] fall precautions as indicated by assessment   - Educate patient/family on patient safety including physical limitations  - Instruct patient to call for assistance with activity based on assessment  - Modify environment to reduce risk of injury  - Consider OT/PT consult to assist with strengthening/mobility  Outcome: Progressing     Problem: INFECTION - ADULT  Goal: Absence or prevention of progression during hospitalization  Description  INTERVENTIONS:  - Assess and monitor for signs and symptoms of infection  - Monitor lab/diagnostic results  - Monitor all insertion sites, i e  indwelling lines, tubes, and drains  - Monitor endotracheal if appropriate and nasal secretions for changes in amount and color  - [place] appropriate cooling/warming therapies per order  - Administer medications as ordered  - Instruct and encourage patient and family to use good hand hygiene technique  - Identify and instruct in appropriate isolation precautions for identified infection/condition  Outcome: Progressing     Problem: SAFETY ADULT  Goal: Patient will remain free of falls  Description  INTERVENTIONS:  - Assess patient frequently for physical needs  -  Identify cognitive and physical deficits and behaviors that affect risk of falls    -  [place] fall precautions as indicated by assessment   - Educate patient/family on patient safety including physical limitations  - Instruct patient to call for assistance with activity based on assessment  - Modify environment to reduce risk of injury  - Consider OT/PT consult to assist with strengthening/mobility  Outcome: Progressing  Goal: Maintain or return to baseline ADL function  Description  INTERVENTIONS:  - Assess patient frequently for physical needs  -  Identify cognitive and physical deficits and behaviors that affect risk of falls    -  [place] fall precautions as indicated by assessment   - Educate patient/family on patient safety including physical limitations  - Instruct patient to call for assistance with activity based on assessment  - Modify environment to reduce risk of injury  - Consider OT/PT consult to assist with strengthening/mobility  Outcome: Progressing  Goal: Maintain or return mobility status to optimal level  Description  INTERVENTIONS:  -  Assess patient's ability to carry out ADLs; assess patient's baseline for ADL function and identify physical deficits which impact ability to perform ADLs (bathing, care of mouth/teeth, toileting, grooming, dressing, etc )  - Assess/evaluate cause of self-care deficits   - Assess range of motion  - Assess patient's mobility; develop plan if impaired  - Assess patient's need for assistive devices and provide as appropriate  - Encourage maximum independence but intervene and supervise when necessary  - Involve family in performance of ADLs  - Assess for home care needs following discharge   - Consider OT consult to assist with ADL evaluation and planning for discharge  - Provide patient education as appropriate  Outcome: Progressing     Problem: DISCHARGE PLANNING  Goal: Discharge to home or other facility with appropriate resources  Description  INTERVENTIONS:  - Identify barriers to discharge w/patient and caregiver  - Arrange for needed discharge resources and transportation as appropriate  - Identify discharge learning needs (meds, wound care, etc )  - Arrange for interpretive services to assist at discharge as needed  - Refer to Case Management Department for coordinating discharge planning if the patient needs post-hospital services based on physician/advanced practitioner order or complex needs related to functional status, cognitive ability, or social support system  Outcome: Progressing     Problem: Knowledge Deficit  Goal: Patient/family/caregiver demonstrates understanding of disease process, treatment plan, medications, and discharge instructions  Description  Complete learning assessment and assess knowledge base    Interventions:  - Provide teaching at level of understanding  - Provide teaching via preferred learning methods  Outcome: Progressing     Problem: RESPIRATORY - ADULT  Goal: Achieves optimal ventilation and oxygenation  Description  INTERVENTIONS:  - Assess for changes in respiratory status  - Assess for changes in mentation and behavior  - Position to facilitate oxygenation and minimize respiratory effort  - Oxygen administered by appropriate delivery if ordered  - Initiate smoking cessation education as indicated  - Encourage broncho-pulmonary hygiene including cough, deep breathe, Incentive Spirometry  - Assess the need for suctioning and aspirate as needed  - Assess and instruct to report SOB or any respiratory difficulty  - Respiratory Therapy support as indicated  Outcome: Not Progressing/needs reinforcement wearing oxygen nasal cannula   Problem: MUSCULOSKELETAL - ADULT  Goal: Maintain or return mobility to safest level of function  Description  INTERVENTIONS:  - Assess patient's ability to carry out ADLs; assess patient's baseline for ADL function and identify physical deficits which impact ability to perform ADLs (bathing, care of mouth/teeth, toileting, grooming, dressing, etc )  - Assess/evaluate cause of self-care deficits   - Assess range of motion  - Assess patient's mobility  - Assess patient's need for assistive devices and provide as appropriate  - Encourage maximum independence but intervene and supervise when necessary  - Involve family in performance of ADLs  - Assess for home care needs following discharge   - Consider OT consult to assist with ADL evaluation and planning for discharge  - Provide patient education as appropriate  Outcome: Progressing  Goal: Maintain proper alignment of affected body part  Description  INTERVENTIONS:  - Support, maintain and protect limb and body alignment  - Provide patient/ family with appropriate education  Outcome: Progressing     Problem: Prexisting or High Potential for Compromised Skin Integrity  Goal: Skin integrity is maintained or improved  Description  INTERVENTIONS:  - Identify patients at risk for skin breakdown  - Assess and monitor skin integrity  - Assess and monitor nutrition and hydration status  - Monitor labs   - Assess for incontinence   - Turn and reposition patient  - Assist with mobility/ambulation  - Relieve pressure over bony prominences  - Avoid friction and shearing  - Provide appropriate hygiene as needed including keeping skin clean and dry  - Evaluate need for skin moisturizer/barrier cream  - Collaborate with interdisciplinary team   - Patient/family teaching  - Consider wound care consult   Outcome: Progressing

## 2020-02-28 NOTE — SPEECH THERAPY NOTE
Speech-Language Pathology Bedside Swallow Evaluation    Patient Name: Mateo Dempsey    ABYEM'Q Date: 2/28/2020     Problem List  Principal Problem:    Acute respiratory failure with hypoxia ([facility] )  Active Problems:    Influenza A    Type 2 diabetes mellitus with hyperglycemia, without long-term current use of insulin (HCC)    Essential hypertension    Pulmonary infiltrate in right lung on CXR    History of breast cancer    COPD exacerbation (HCC)    Thrombocytopenia ([facility] )    Morbid obesity due to excess calories [facility])      Past Medical History  Past Medical History:   Diagnosis Date    [facility]     [facility]     Hypertension        Past Surgical History  Past Surgical History:   Procedure Laterality Date    BREAST SURGERY      left lumpectomy    CARPAL TUNNEL RELEASE      CATARACT EXTRACTION      JOINT REPLACEMENT         Summary   Pt presented with functional appearing oral and pharyngeal stage swallowing skills with materials administered today  Pt had a harsh cough at baseline, however no s/s aspiration with thin liquids or regular solids  Suspect R lobe infiltrate unrelated to swallow function  Risk/s for Aspiration: Suspect low    Recommended Diet: regular diet and thin liquids   Recommended Form of Meds: whole with liquid   Aspiration precautions and swallowing strategies: upright posture, only feed when fully alert, slow rate of feeding and small bites/sips      Current Medical Status per Radhika Campos's H&P 2/24/2020  Mateo Dempsey is a 69 y o  female with history of COPD, essential hypertension, type 2 diabetes non-insulin-dependent, history of left breast cancer, and current smoker who presents with a two day history of shortness of breath and fever  The shortness of breath was gradual and worsening  Was worsened by activity  It is associated with a cough productive of clear to yellow sputum  She said she had fever on two separate occasions at home  Denies chills  Denies hemoptysis  No chest pain  Denies nausea or vomiting  No syncope or focal weakness  She had gone to an urgent center and was diagnosed with URI  It was recommended that she go to the ER for possible admission, but she did not want to come at that time  At home, her shortness of breath worsened, she decided to come to the ER  In triage, her saturations were noted to be 70%  She was improved with administration of nasal cannula oxygen at 2 L and nebulizer treatments  Current Precautions:  Fall     Special Studies:  CXR 2/24/2020 IMPRESSION:  Patchy density in the right infrahilar region suspicious for infiltrate  Moderate pulmonary vascular congestion  Social/Education/Vocational Hx:  Pt lives alone    Swallow Information   Current Risks for Dysphagia & Aspiration: questionable R lobe infiltrate  Current Diet: regular diet and thin liquids   Baseline Diet: regular diet and thin liquids      Baseline Assessment   Behavior/Cognition: alert  Speech/Language Status: able to participate in conversation and able to follow commands  Patient Positioning: upright in chair  Pain Status/Interventions/Response to Interventions:  No report of or nonverbal indications of pain  Swallow Mechanism Exam  Facial: symmetrical  Labial: WFL  Lingual: WFL  Velum: symmetrical  Mandible: adequate ROM  Dentition: full dentures  Vocal quality:clear/adequate   Volitional Cough: congested, non productive cough at baseline       Consistencies Assessed and Performance   Consistencies Administered: thin liquids and hard solids  Materials administered included water and animal crackers    Oral Stage: WFL  Mastication was adequate with the materials administered today  Mild inattention to bolus with talking during mastication, but bolus formation and transfer were functional with no significant oral residue noted  No overt s/s reduced oral control      Pharyngeal Stage: [facility]  Swallow Mechanics: Swallowing initiation appeared prompt  Laryngeal rise was palpated and judged to be within functional limits  No coughing, throat clearing, change in vocal quality or respiratory status noted today  Esophageal Concerns: none reported       Summary and Recommendations (see above)    Results Reviewed with: patient     Treatment Recommended: no additional ST f/u needed at this time

## 2020-03-01 LAB
BACTERIA BLD CULT: NORMAL
BACTERIA BLD CULT: NORMAL

## 2020-03-01 NOTE — ASSESSMENT & PLAN NOTE
Lab Results   Component Value Date    HGBA1C 7 7 (H) 02/25/2020       Recent Labs     02/27/20  1616 02/27/20  2209 02/27/20  2211 02/28/20  0636   POCGLU 250* 81 80 93       Blood Sugar Average: Last 72 hrs:  (P) 080 [id number]     · Diabetic diet  · Hold glipizide while in hospital  · Fingerstick blood sugar with sliding scale coverage  ·  10 units subcu t i d  A c ---inc to 15  · Check hemoglobin A1c

## 2020-03-01 NOTE — DISCHARGE SUMMARY
Discharge- Tammi Purvis 1945, 69 y o  female MRN: [id number]    Unit/Bed#: -01 Encounter: [id number]    Primary Care Provider: Sandip Holly MD   Date and time admitted to hospital: 2/24/2020  6:09 PM        * Acute respiratory failure with hypoxia ([address] )  Assessment & Plan  · POA, evidence by 70% oxygen saturation on room air on admission, improved to 89% with 4 L  Patient was tachypneic and required BiPAP and was rapid  · Likely multifactorial   Patient influenza positive  Patchy infiltrate on chest x-ray  Vascular congestion  · Now on 3 L nasal cannula at rest  · Patient may have underlying component of COPD, though she claims she has no history of this  She does that she was previously tested and [place]  · Refer to pulmonology at discharge with follow-up with PCP  · Patient is leaving against medical advice          Type 2 diabetes mellitus with hyperglycemia, without long-term current use of insulin [facility])  Assessment & Plan  Lab Results   Component Value Date    HGBA1C 7 7 (H) 02/25/2020       Recent Labs     02/27/20  1616 02/27/20  2209 02/27/20  2211 02/28/20  0636   POCGLU 250* 81 80 93       Blood Sugar Average: Last 72 hrs:  (P) 080 [id number]     · Diabetic diet  · Hold glipizide while in hospital  · Fingerstick blood sugar with sliding scale coverage  ·  10 units subcu t i d  A c ---inc to 15  · Check hemoglobin A1c      Influenza A  Assessment & Plan  · Tamiflu                Resolved Problems  Date Reviewed: 2/26/2020    None          Admission Date:   Admission Orders (From admission, onward)     Ordered        02/24/20 2053  Inpatient Admission  Once                     Admitting Diagnosis: SOB (shortness of breath) [R06 02]  Influenza A [J10 1]  Hypoxia [R09 02]  COPD exacerbation (HCC) [J44 1]  Pneumonia of right middle lobe due to infectious organism [facility]) [J18 1]        Procedures Performed:   Orders Placed This Encounter   Procedures   [facility] ED ECG Documentation Only       Summary of Hospital Course: This is a 52-year-old female who presents with acute hypoxic respiratory failure with and past history type 2 diabetes history of left-sided breast cancer history of smoking with symptoms of shortness of breath and fever  The patient presents the hospital with increasing cough and productive sputum  She was admitted for further workup evaluation  She was treated for suspected COPD exacerbation  However, please note patient is very adamant that she does not have a diagnosis of COPD and that she has been tested as an outpatient for this  Regardless however, she was admitted for acute hypoxic respiratory failure in treated empirically with steroids  Oxygen saturation on room air was noted to be in high 70s to low 80s  With nasal cannula did did sit improved to above 90  While discussing case with her she was very adamant that her oxygen issues were only here and not at home  While agreeable to wearing the oxygen nasal cannula in the hospital she reports that she has no desire to return home with oxygen therapy  She reports that she can take care of herself at home  She reports that she can do the things that she needs to thrive at home  I did discuss with her the concern over need for home oxygen at a minimum  However she was adamant that she did not wish to pursue oxygen therapy  She subsequently left against medical advice  I did give her an Rx for coverage for pneumonia  I also instructed to come to the hospital if she began having worsening symptoms or shortness of breath  Disposition:  Patient was admitted for hypoxia with suspected pneumonia versus COPD exacerbation  She requires oxygen therapy to maintain her oxygen saturation above 90  However, she left against medical advice          Condition at Discharge: poor         Discharge instructions/Information to patient and family:   See after visit summary for information provided to patient and family  Provisions for Follow-Up Care:  See after visit summary for information related to follow-up care and any pertinent home health orders  PCP: Sera Lance MD    Disposition: Home    Planned Readmission: No    Discharge Statement   I spent 35 minutes discharging the patient  This time was spent on the day of discharge  I had direct contact with the patient on the day of discharge  Additional documentation is required if more than 30 minutes were spent on discharge  Discharge Medications:  See after visit summary for reconciled discharge medications provided to patient and family

## 2022-08-25 ENCOUNTER — EVALUATION (OUTPATIENT)
Dept: PHYSICAL THERAPY | Facility: CLINIC | Age: 77
End: 2022-08-25
Payer: MEDICARE

## 2022-08-25 DIAGNOSIS — I89.0 LYMPHEDEMA: Primary | ICD-10-CM

## 2022-08-25 PROCEDURE — 97161 PT EVAL LOW COMPLEX 20 MIN: CPT | Performed by: PHYSICAL THERAPIST

## 2022-08-25 NOTE — PROGRESS NOTES
PT Evaluation     Today's date: 2022  Patient name: Lana Carter  : 1945  MRN: [id number]  Referring provider: Temitope Sykes MD  Dx:   Encounter Diagnosis     ICD-10-CM    1  Lymphedema  I89 0                   Assessment  Assessment details: Pt presents w/ B LE edema w/ + stemmer's, moderate pitting, and BL lower leg skin fibrosis, predominant involvement of B lower legs distal 2/3rds into foot and ankles   L>R girth measurements are noted which fits visual inspection of L>R LE size  PMHx significant for THR and COPD  which could have been an indicator and potential compromise of LE lymphatics, but also reporting that she sleeps in a recliner that does not elevate her legs which could have contributed to current symptoms  Currently, pt has difficulty walking for periods of time, standing for periods of time, and stair climbing  Advise initiating full CDT protocol w/ MLD and compression wrapping to reduce limb size and improve patent's current function  Thank you for the referral    Impairments: abnormal gait, activity intolerance, impaired balance, impaired physical strength, lacks appropriate home exercise program and pain with function  Other impairment: swelling, skin fibrosis     Symptom irritability: moderateUnderstanding of Dx/Px/POC: excellent  Goals  ST  Reduce B LE edema >1 cm girth measurement within 2 weeks  2  Pt to obtain compression wrap supplies within 2 weeks  LT  Reduce B LE girth measurements > 2 cm within 5 wks  2  Pt I in self MLD and transition to phase 2 CDT protocol within 5 wks  3  Pt will be able to ambulate >75 ft  Without having to sit down to rest by DC       Plan  Patient would benefit from: skilled physical therapy  Planned therapy interventions: massage, manual therapy, patient education, functional ROM exercises, gait training, home exercise program, stretching, strengthening, therapeutic activities and therapeutic exercise  Other planned therapy interventions: MLD, compression wrapping, wound assessment, skin hygiene education   Frequency: 2x week  Duration in weeks: 4  Treatment plan discussed with: patient        Subjective Evaluation    History of Present Illness  Mechanism of injury: Pt notes that she got her hip replaced in   She notes that shortly after that she noticed more swelling in her legs  She states that she does have compression socks and has a pump at home  Has not been compliant with using it daily  She has been sleeping in her recliner because she has trouble breathing (uses a CPAP) and has COPD  She notes that her recliner broke a few months ago and so her legs have been in a dependent position when she sleeps  Has noticed that her legs have gotten worse since her recliner has broke  Pt lives alone, she does have a sister that is close that can help  Pt has stairs to get down into her garage, states that they continue to be very challenging  Pt does have general pain in her legs and states that she has been sitting and less active     Quality of life: excellent    Pain  Current pain ratin  At best pain ratin  At worst pain ratin  Location: general BL leg pain  Quality: tight and discomfort  Aggravating factors: stair climbing, walking and standing    Social Support  Steps to enter house: yes (garage)  Stairs in house: no   Lives in: [facility]  Lives with: alone    Employment status: not working    Diagnostic Tests  No diagnostic tests performed  Treatments  Previous treatment: physical therapy (Did have lymphedema therapy >4 years ago)  Patient Goals  Patient goals for therapy: decreased edema, decreased pain, increased strength and independence with ADLs/IADLs  Patient goal: "I want the swelling to go down in my legs "        Objective      LOWER EXTREMITY LEFT CALCULATIONS    Flowsheet Row Most Recent Value   Volume LE (mL) 7156   Difference from last visit (mL)  -7156   Difference from first visit (mL)  -8067 LOWER EXTREMITY RIGHT CALCULATIONS    Flowsheet Row Most Recent Value   Volume LE (mL) 6488   Difference from last visit (mL)  -6488   Difference from first visit (mL)  -9413          Physical assessment: B/L swelling L>R, from below knee to toes  Moderate skin fibrosis and skin breakdown (dryness/ hyperkeratosis) in lower legs to ankles  Pt reports weeping in the anterior aspect of BL shins, no currently at IE  Palpation: moderate heaviness B lower legs, worse on L   Skin Mobility: severe B lower legs  Skin color: increased redness in B lower legs  Pittin+  Wound presence: n/a  Stemmer's Sign: +     Transfer status: I but takes increased time- uses cane to ambulate   Ability to don/doff clothing/garments: I but states that it takes her increased time and is difficult   Pt able to don/doff stockings, not able to put socks on I  Toe nail hygiene: Poor, but does get them professionally cut(foot doctor)       Precautions: COPD, DM2     Daily Treatment Diary:      Initial Evaluation Date: 22  Compliance                      Visit Number 1                    Re-Eval  IE                 [facility]   Foto Captured Y                                                Manual                      Modified MLD                      Skin hygiene/education                      BL compression wraps legs/feet                       Ther-Ex                                                                                                                                                                                                                                                  Neuro Re-Ed                                                                                                Ther-Act                                                               Modalities

## 2022-08-25 NOTE — LETTER
2022    Sindhu Stockton MD  [address]    Patient: Lucas Taylor   YOB: 1945   Date of Visit: 2022     Encounter Diagnosis     ICD-10-CM    1  Lymphedema  I89 0        Dear Dr Garcia Severe: Thank you for your recent referral of Lucas Taylor  Please review the attached evaluation summary from Shannan's recent visit  Please verify that you agree with the plan of care by signing the attached order  If you have any questions or concerns, please do not hesitate to call  I sincerely appreciate the opportunity to share in the care of one of your patients and hope to have another opportunity to work with you in the near future  Sincerely,    Anabel Orr, PT      Referring Provider:      I certify that I have read the below Plan of Care and certify the need for these services furnished under this plan of treatment while under my care  Sindhu Stockton MD  [address] [address]  Via Fax: [phone]          PT Evaluation     Today's date: 2022  Patient name: Lucas Taylor  : 1945  MRN: [id number]  Referring provider: Elder Wan MD  Dx:   Encounter Diagnosis     ICD-10-CM    1  Lymphedema  I89 0                   Assessment  Assessment details: Pt presents w/ B LE edema w/ + stemmer's, moderate pitting, and BL lower leg skin fibrosis, predominant involvement of B lower legs distal 2/3rds into foot and ankles   L>R girth measurements are noted which fits visual inspection of L>R LE size  PMHx significant for THR and COPD  which could have been an indicator and potential compromise of LE lymphatics, but also reporting that she sleeps in a recliner that does not elevate her legs which could have contributed to current symptoms  Currently, pt has difficulty walking for periods of time, standing for periods of time, and stair climbing   Advise initiating full CDT protocol w/ MLD and compression wrapping to reduce limb size and improve patent's current function  Thank you for the referral    Impairments: abnormal gait, activity intolerance, impaired balance, impaired physical strength, lacks appropriate home exercise program and pain with function  Other impairment: swelling, skin fibrosis     Symptom irritability: moderateUnderstanding of Dx/Px/POC: excellent  Goals  ST  Reduce B LE edema >1 cm girth measurement within 2 weeks  2  Pt to obtain compression wrap supplies within 2 weeks  LT  Reduce B LE girth measurements > 2 cm within 5 wks  2  Pt I in self MLD and transition to phase 2 CDT protocol within 5 wks  3  Pt will be able to ambulate >75 ft  Without having to sit down to rest by DC  Plan  Patient would benefit from: skilled physical therapy  Planned therapy interventions: massage, manual therapy, patient education, functional ROM exercises, gait training, home exercise program, stretching, strengthening, therapeutic activities and therapeutic exercise  Other planned therapy interventions: MLD, compression wrapping, wound assessment, skin hygiene education   Frequency: 2x week  Duration in weeks: 4  Treatment plan discussed with: patient        Subjective Evaluation    History of Present Illness  Mechanism of injury: Pt notes that she got her hip replaced in   She notes that shortly after that she noticed more swelling in her legs  She states that she does have compression socks and has a pump at home  Has not been compliant with using it daily  She has been sleeping in her recliner because she has trouble breathing (uses a CPAP) and has COPD  She notes that her recliner broke a few months ago and so her legs have been in a dependent position when she sleeps  Has noticed that her legs have gotten worse since her recliner has broke  Pt lives alone, she does have a sister that is close that can help   Pt has stairs to get down into her garage, states that they continue to be very challenging  Pt does have general pain in her legs and states that she has been sitting and less active  Quality of life: excellent    Pain  Current pain ratin  At best pain ratin  At worst pain ratin  Location: general BL leg pain  Quality: tight and discomfort  Aggravating factors: stair climbing, walking and standing    Social Support  Steps to enter house: yes (garage)  Stairs in house: no   Lives in: [facility]  Lives with: alone    Employment status: not working    Diagnostic Tests  No diagnostic tests performed  Treatments  Previous treatment: physical therapy (Did have lymphedema therapy >4 years ago)  Patient Goals  Patient goals for therapy: decreased edema, decreased pain, increased strength and independence with ADLs/IADLs  Patient goal: "I want the swelling to go down in my legs "        Objective      LOWER EXTREMITY LEFT CALCULATIONS    Flowsheet Row Most Recent Value   Volume LE (mL) 7156   Difference from last visit (mL)  -7156   Difference from first visit (mL)  -7156      LOWER EXTREMITY RIGHT CALCULATIONS    Flowsheet Row Most Recent Value   Volume LE (mL) 6488   Difference from last visit (mL)  -6488   Difference from first visit (mL)  -3945          Physical assessment: B/L swelling L>R, from below knee to toes  Moderate skin fibrosis and skin breakdown (dryness/ hyperkeratosis) in lower legs to ankles  Pt reports weeping in the anterior aspect of BL shins, no currently at IE  Palpation: moderate heaviness B lower legs, worse on L   Skin Mobility: severe B lower legs  Skin color: increased redness in B lower legs  Pittin+  Wound presence: n/a  Stemmer's Sign: +     Transfer status: I but takes increased time- uses cane to ambulate   Ability to don/doff clothing/garments: I but states that it takes her increased time and is difficult   Pt able to don/doff stockings, not able to put socks on I  Toe nail hygiene: Poor, but does get them professionally cut(foot doctor)       Precautions: COPD, DM2     Daily Treatment Diary:      Initial Evaluation Date: 08/25/22  Compliance 8/25                     Visit Number 1                    Re-Eval  IE                 [facility]   Foto Captured Y                           8/25                     Manual                      Modified MLD                      Skin hygiene/education                      BL compression wraps legs/feet                       Ther-Ex                                                                                                                                                                                                                                                  Neuro Re-Ed                                                                                                Ther-Act                                                               Modalities

## 2022-08-30 ENCOUNTER — APPOINTMENT (OUTPATIENT)
Dept: PHYSICAL THERAPY | Facility: CLINIC | Age: 77
End: 2022-08-30
Payer: MEDICARE

## 2022-09-01 ENCOUNTER — OFFICE VISIT (OUTPATIENT)
Dept: PHYSICAL THERAPY | Facility: CLINIC | Age: 77
End: 2022-09-01
Payer: MEDICARE

## 2022-09-01 DIAGNOSIS — I89.0 LYMPHEDEMA: Primary | ICD-10-CM

## 2022-09-01 PROCEDURE — 97140 MANUAL THERAPY 1/> REGIONS: CPT | Performed by: PHYSICAL THERAPIST

## 2022-09-01 NOTE — PROGRESS NOTES
Daily Note     Today's date: 2022  Patient name: Lana Carter  : 1945  MRN: [id number]  Referring provider: Temitope Sykes MD  Dx:   Encounter Diagnosis     ICD-10-CM    1  Lymphedema  I89 0                   Subjective: Pt notes feeling about the same since last visit  Forgot her wraps today  Objective: See treatment diary below      Assessment: Tolerated treatment well  Visually, pt's lower legs look similar to IE  PT educated pt on the wraps and purchasing them soon  Pt continues to have skin fibrosis specifically around BL ankles, but should improve with wearing wraps  Pt tolerated modified MLD well with no side effects  Patient would benefit from continued PT to address swelling in BL LE  Plan: Continue per plan of care           Precautions: COPD, DM2     Daily Treatment Diary:      Initial Evaluation Date: 22  Compliance                    Visit Number 1 2                   Re-Eval  [facility] -                [facility]   Foto Captured Y -                                             Manual                      Modified MLD   40'                   Skin hygiene/education   5'                   BL compression wraps legs/feet    -                   Ther-Ex                                                                                                                                                                                                                                                  Neuro Re-Ed                                                                                                Ther-Act                                                               Modalities

## 2022-09-06 ENCOUNTER — APPOINTMENT (OUTPATIENT)
Dept: PHYSICAL THERAPY | Facility: CLINIC | Age: 77
End: 2022-09-06
Payer: MEDICARE

## 2022-09-08 ENCOUNTER — APPOINTMENT (OUTPATIENT)
Dept: PHYSICAL THERAPY | Facility: CLINIC | Age: 77
End: 2022-09-08
Payer: MEDICARE

## 2022-09-13 ENCOUNTER — APPOINTMENT (OUTPATIENT)
Dept: PHYSICAL THERAPY | Facility: CLINIC | Age: 77
End: 2022-09-13
Payer: MEDICARE

## 2022-09-15 ENCOUNTER — APPOINTMENT (OUTPATIENT)
Dept: PHYSICAL THERAPY | Facility: CLINIC | Age: 77
End: 2022-09-15
Payer: MEDICARE

## 2022-09-20 ENCOUNTER — OFFICE VISIT (OUTPATIENT)
Dept: PHYSICAL THERAPY | Facility: CLINIC | Age: 77
End: 2022-09-20
Payer: MEDICARE

## 2022-09-20 DIAGNOSIS — I89.0 LYMPHEDEMA: Primary | ICD-10-CM

## 2022-09-20 PROCEDURE — 97530 THERAPEUTIC ACTIVITIES: CPT | Performed by: PHYSICAL THERAPIST

## 2022-09-20 PROCEDURE — 97140 MANUAL THERAPY 1/> REGIONS: CPT | Performed by: PHYSICAL THERAPIST

## 2022-09-20 NOTE — PROGRESS NOTES
Daily Note     Today's date: 2022  Patient name: Joaquin Coker  : 1945  MRN: [id number]  Referring provider: Dayron Winslow MD  Dx:   Encounter Diagnosis     ICD-10-CM    1  Lymphedema  I89 0                   Subjective: Pt notes that she is still struggling to elevate her legs due to her chair being broken  She has been trying to sleep in her bed a little more, but cannot last long due to being uncomfortable  Brought her wraps with her today  Objective: See treatment diary below      Assessment: Tolerated treatment well  Pt continues to have skin fibrosis and swelling in BL ankles and into lower legs  Some redness present in BL lower legs, but has been on antibiotics for cellulitis recently  Pt tolerated modified MLD with no negative side effects  PT educated pt on how to properly don/doff the wraps that she brought  Discussed with her possible negative side effects of wraps and when to take them off  Pt understood the importance of proper fitting of wraps and how to properly doff them on her own at home  Spent time talking about skin hygiene and the importance of elevating her legs at much as possible  Patient would benefit from continued PT to address current limitations  Plan: Continue per plan of care        Precautions: COPD, DM2     Daily Treatment Diary:      Initial Evaluation Date: 22  Compliance                  Visit Number 1 2  3                 Re-Eval  IE -  -              MC   Foto Captured Y -  -                                         Manual                      Modified MLD   36'  36'                 Skin hygiene/education   5'  5'                 BL compression wraps legs/feet    -  10'                 Ther-Ex Neuro Re-Ed                                                                                                Ther-Act                                                               Modalities

## 2022-09-22 ENCOUNTER — OFFICE VISIT (OUTPATIENT)
Dept: PHYSICAL THERAPY | Facility: CLINIC | Age: 77
End: 2022-09-22
Payer: MEDICARE

## 2022-09-22 DIAGNOSIS — I89.0 LYMPHEDEMA: Primary | ICD-10-CM

## 2022-09-22 PROCEDURE — 97140 MANUAL THERAPY 1/> REGIONS: CPT | Performed by: PHYSICAL THERAPIST

## 2022-09-22 NOTE — PROGRESS NOTES
Daily Note     Today's date: 2022  Patient name: Serina Washington  : 1945  MRN: [id number]  Referring provider: Sofy Fernandez MD  Dx:   Encounter Diagnosis     ICD-10-CM    1  Lymphedema  I89 0                   Subjective: Pt notes that she could not tolerate the wraps for more than a few hours after therapy on Tuesday, but thinks that her ankles look better since wearing them  Objective: See treatment diary below      Assessment: Tolerated treatment well  Visually, swelling around BL ankles looked improved  She continues to have moderate skin fibrosis and hyperkeratosis around BL ankles  Tried foam around ankles today under wraps, follow up to see how she tolerated it  Pt noted comfort with wraps and tubigrip on BL feel/ankles  Patient would benefit from continued PT to address current limitations  Plan: Continue per plan of care        Precautions: COPD, DM2     Daily Treatment Diary:      Initial Evaluation Date: 22  Compliance                Visit Number 1 2  3  4               Re-Eval  IE -  -  -            MC   Foto Captured Y -  -  -                                     Manual                      Modified MLD   36'  40'  40'               Skin hygiene/education   5'  5'  5'               BL compression wraps legs/feet    -  10'  10'               Ther-Ex                                                                                                                                                                                                                                                  Neuro Re-Ed                                                                                                Ther-Act                                                               Modalities

## 2022-09-27 ENCOUNTER — OFFICE VISIT (OUTPATIENT)
Dept: PHYSICAL THERAPY | Facility: CLINIC | Age: 77
End: 2022-09-27
Payer: MEDICARE

## 2022-09-27 DIAGNOSIS — I89.0 LYMPHEDEMA: Primary | ICD-10-CM

## 2022-09-27 PROCEDURE — 97140 MANUAL THERAPY 1/> REGIONS: CPT | Performed by: PHYSICAL THERAPIST

## 2022-09-27 NOTE — PROGRESS NOTES
Daily Note     Today's date: 2022  Patient name: Julee Mariee  : 1945  MRN: [id number]  Referring provider: Fiorella Gerardo MD  Dx:   Encounter Diagnosis     ICD-10-CM    1  Lymphedema  I89 0                   Subjective: Pt notes that she was not able to tolerate the wraps for too long because she had an accident  She was not able to put them back on by herself  She is hopeful that she can wear them for longer this week  Objective: See treatment diary below      Assessment: Tolerated treatment well  Increased dry skin continues to be prominent around BL ankles and toes  Use of foam around BL ankles continued this visit  Pt tolerated modified MLD and did not have any open wounds in BL LE  Pt noted relief in legs following wrap application, did not get her extra straps yet  Will follow up nv  Patient would benefit from continued PT to address current limitations  Plan: Continue per plan of care        Precautions: COPD, DM2     Daily Treatment Diary:      Initial Evaluation Date: 22  Compliance              Visit Number 1 2  3  4  5             Re-Eval  IE -  -  -  -          MC   Foto Captured Y -  -  -  -                                 Manual                      Modified MLD   36'  40'  40'  40'             Skin hygiene/education   5'  5'  5'  5'             BL compression wraps legs/feet    -  10'  10'  10'             Ther-Ex                                                                                                                                                                                                                                                  Neuro Re-Ed                                                                                                Ther-Act                                                               Modalities

## 2022-09-29 ENCOUNTER — OFFICE VISIT (OUTPATIENT)
Dept: PHYSICAL THERAPY | Facility: CLINIC | Age: 77
End: 2022-09-29
Payer: MEDICARE

## 2022-09-29 DIAGNOSIS — I89.0 LYMPHEDEMA: Primary | ICD-10-CM

## 2022-09-29 PROCEDURE — 97140 MANUAL THERAPY 1/> REGIONS: CPT | Performed by: PHYSICAL THERAPIST

## 2022-09-29 NOTE — LETTER
October 3, 2022    Rosanna Hirsch MD  [address]    Patient: Hermann Temple   YOB: 1945   Date of Visit: 2022     Encounter Diagnosis     ICD-10-CM    1  Lymphedema  I89 0        Dear Dr Lydia Arias: Thank you for your recent referral of Hermann Temple  Please review the attached evaluation summary from Shannan's recent visit  Please verify that you agree with the plan of care by signing the attached order  If you have any questions or concerns, please do not hesitate to call  I sincerely appreciate the opportunity to share in the care of one of your patients and hope to have another opportunity to work with you in the near future  Sincerely,    Mahesh Yepez PT      Referring Provider:      I certify that I have read the below Plan of Care and certify the need for these services furnished under this plan of treatment while under my care  Rosanna Hirsch MD  [address] [address]  Via Fax: [phone]          Re-Evaluation    Today's date: 2022  Patient name: Hermann Temple  : 1945  MRN: [id number]  Referring provider: Janessa Rios MD  Dx:   Encounter Diagnosis     ICD-10-CM    1  Lymphedema  I89 0                       Assessment  Assessment details: Pt presents to re-evaluation of lymphedema of BL LE  Measurements taken today show a reduction in size, but continues to have moderate swelling around BL ankles  Pt's skin is improving with foam, but continues to have hyperkeratosis of skin and increased swelling around BL ankles  Pt has been having difficulty sleeping and elevating her legs at home due to discomfort  She has not put her compression pumps on at home the last few weeks due to difficulty doing it alone, but is hopeful she can start putting them on soon  Pt continues to have swelling and poor skin mobility/fibrosis due to increased swelling   Pt would benefit from continued therapy to address current deficits  Thank you for the referral        Impairments: abnormal gait, activity intolerance, impaired balance, impaired physical strength, lacks appropriate home exercise program and pain with function  Other impairment: swelling, skin fibrosis     Symptom irritability: moderateUnderstanding of Dx/Px/POC: excellent  Goals  ST  Reduce B LE edema >1 cm girth measurement within 2 weeks  - met  2  Pt to obtain compression wrap supplies within 2 weeks  -not met (pt waiting on extra straps for BL LE)  LT  Reduce B LE girth measurements > 2 cm within 5 wks-in progress (upper leg measurements were unchanged )  2  Pt I in self MLD and transition to phase 2 CDT protocol within 5 wks-not met  3  Pt will be able to ambulate >75 ft  Without having to sit down to rest by DC  -not met    Plan  Patient would benefit from: skilled physical therapy  Planned therapy interventions: massage, manual therapy, patient education, functional ROM exercises, gait training, home exercise program, stretching, strengthening, therapeutic activities and therapeutic exercise  Other planned therapy interventions: MLD, compression wrapping, wound assessment, skin hygiene education   Frequency: 2x week  Duration in weeks: 4  Treatment plan discussed with: patient        Subjective Evaluation    History of Present Illness  Mechanism of injury: Pt notes that she still is having a hard time elevating her legs due to her recliner being broken  She is also not able to use her compression pumps due to her recliner being broke  Pt states that she was able to keep her wraps on for two days, but stated that they kept falling down  She had some discomfort around her ankles, likely due to the wraps bunching up  Pt is hopeful that the extra straps will help and she can start using her compression pumps at home         Quality of life: excellent    Pain  Current pain ratin  At best pain ratin  At worst pain rating: 6  Location: general BL leg pain  Quality: tight and discomfort  Aggravating factors: stair climbing, walking and standing    Social Support  Steps to enter house: yes (garage)  Stairs in house: no   Lives in: [facility]  Lives with: alone    Employment status: not working    Diagnostic Tests  No diagnostic tests performed  Treatments  Previous treatment: physical therapy (Did have lymphedema therapy >4 years ago)  Patient Goals  Patient goals for therapy: decreased edema, decreased pain, increased strength and independence with ADLs/IADLs  Patient goal: "I want the swelling to go down in my legs "        Objective  LOWER EXTREMITY LEFT CALCULATIONS    Flowsheet Row Most Recent Value   Volume LE (mL) 6267   Difference from last visit (mL)  889   Difference from first visit (mL)  889   Difference from last visit (%)  12   Difference from first visit (%)  12      LOWER EXTREMITY RIGHT CALCULATIONS    Flowsheet Row Most Recent Value   Volume LE (mL) 6291   Difference from last visit (mL)  197   Difference from first visit (mL)  197   Difference from last visit (%)  3   Difference from first visit (%)  3              Physical assessment: B/L swelling L>R, from below knee to toes  Moderate skin fibrosis and skin breakdown (dryness/ hyperkeratosis) around BL ankles  Pt reports weeping in the anterior aspect of BL shins, none currently at IE  Palpation: moderate heaviness B lower legs, worse on L   Skin Mobility: severe B lower legs  Skin color: increased redness in B lower legs  Pittin+  Wound presence: n/a  Stemmer's Sign: +     Transfer status: I but takes increased time- uses cane to ambulate   Ability to don/doff clothing/garments: I but states that it takes her increased time and is difficult   Pt able to don/doff stockings, not able to put socks on I  Toe nail hygiene: Poor, but does get them professionally cut(foot doctor)         Precautions: COPD, DM2     Daily Treatment Diary:      Initial Evaluation Date: 08/25/22  Compliance 8/25 9/1 9/20 9/22 9/27 9/29           Visit Number 1 2  3  4  5  6           Re-Eval  IE -  -  -  -  Y        MC   Foto Captured Y -  -  -  -  Y                  8/25 9/1 9/20 9/22 9/27 9/29           Manual                      Modified MLD   36'  36'  40'  40'  40'           Skin hygiene/education   5'  5'  5'  5'  5'           BL compression wraps legs/feet    -  10'  10'  10'  10'           Measurements      arb       Ther-Ex                                                                                                                                                                                                                                                  Neuro Re-Ed                                                                                                Ther-Act                                                               Modalities

## 2022-09-29 NOTE — PROGRESS NOTES
Re-Evaluation    Today's date: 2022  Patient name: Marcela Shah  : 1945  MRN: [id number]  Referring provider: Cydney Dubon MD  Dx:   Encounter Diagnosis     ICD-10-CM    1  Lymphedema  I89 0                       Assessment  Assessment details: Pt presents to re-evaluation of lymphedema of BL LE  Measurements taken today show a reduction in size, but continues to have moderate swelling around BL ankles  Pt's skin is improving with foam, but continues to have hyperkeratosis of skin and increased swelling around BL ankles  Pt has been having difficulty sleeping and elevating her legs at home due to discomfort  She has not put her compression pumps on at home the last few weeks due to difficulty doing it alone, but is hopeful she can start putting them on soon  Pt continues to have swelling and poor skin mobility/fibrosis due to increased swelling  Pt would benefit from continued therapy to address current deficits  Thank you for the referral        Impairments: abnormal gait, activity intolerance, impaired balance, impaired physical strength, lacks appropriate home exercise program and pain with function  Other impairment: swelling, skin fibrosis     Symptom irritability: moderateUnderstanding of Dx/Px/POC: excellent  Goals  ST  Reduce B LE edema >1 cm girth measurement within 2 weeks  - met  2  Pt to obtain compression wrap supplies within 2 weeks  -not met (pt waiting on extra straps for BL LE)  LT  Reduce B LE girth measurements > 2 cm within 5 wks-in progress (upper leg measurements were unchanged )  2  Pt I in self MLD and transition to phase 2 CDT protocol within 5 wks-not met  3  Pt will be able to ambulate >75 ft   Without having to sit down to rest by DC  -not met    Plan  Patient would benefit from: skilled physical therapy  Planned therapy interventions: massage, manual therapy, patient education, functional ROM exercises, gait training, home exercise program, stretching, strengthening, therapeutic activities and therapeutic exercise  Other planned therapy interventions: MLD, compression wrapping, wound assessment, skin hygiene education   Frequency: 2x week  Duration in weeks: 4  Treatment plan discussed with: patient        Subjective Evaluation    History of Present Illness  Mechanism of injury: Pt notes that she still is having a hard time elevating her legs due to her recliner being broken  She is also not able to use her compression pumps due to her recliner being broke  Pt states that she was able to keep her wraps on for two days, but stated that they kept falling down  She had some discomfort around her ankles, likely due to the wraps bunching up  Pt is hopeful that the extra straps will help and she can start using her compression pumps at home         Quality of life: excellent    Pain  Current pain ratin  At best pain ratin  At worst pain ratin  Location: general BL leg pain  Quality: tight and discomfort  Aggravating factors: stair climbing, walking and standing    Social Support  Steps to enter house: yes (garage)  Stairs in house: no   Lives in: [facility]  Lives with: alone    Employment status: not working    Diagnostic Tests  No diagnostic tests performed  Treatments  Previous treatment: physical therapy (Did have lymphedema therapy >4 years ago)  Patient Goals  Patient goals for therapy: decreased edema, decreased pain, increased strength and independence with ADLs/IADLs  Patient goal: "I want the swelling to go down in my legs "        Objective  LOWER EXTREMITY LEFT CALCULATIONS    Flowsheet Row Most Recent Value   Volume LE (mL) 6267   Difference from last visit (mL)  889   Difference from first visit (mL)  889   Difference from last visit (%)  12   Difference from first visit (%)  12      LOWER EXTREMITY RIGHT CALCULATIONS    Flowsheet Row Most Recent Value   Volume LE (mL) 6291   Difference from last visit (mL)  197   Difference from first visit (mL)  197   Difference from last visit (%)  3   Difference from first visit (%)  3              Physical assessment: B/L swelling L>R, from below knee to toes  Moderate skin fibrosis and skin breakdown (dryness/ hyperkeratosis) around BL ankles  Pt reports weeping in the anterior aspect of BL shins, none currently at IE  Palpation: moderate heaviness B lower legs, worse on L   Skin Mobility: severe B lower legs  Skin color: increased redness in B lower legs  Pittin+  Wound presence: n/a  Stemmer's Sign: +     Transfer status: I but takes increased time- uses cane to ambulate   Ability to don/doff clothing/garments: I but states that it takes her increased time and is difficult   Pt able to don/doff stockings, not able to put socks on I  Toe nail hygiene: Poor, but does get them professionally cut(foot doctor)         Precautions: COPD, DM2     Daily Treatment Diary:      Initial Evaluation Date: 22  Compliance            Visit Number 1 2  3  4  5  6           Re-Eval  IE -  -  -  -  Y        MC   Foto Captured Y -  -  -  -  Y                             Manual                      Modified MLD   36'  36'  40'  40'  40'           Skin hygiene/education   5'  5'  5'  5'  5'           BL compression wraps legs/feet    -  10'  10'  10'  10'           Measurements      arb       Ther-Ex                                                                                                                                                                                                                                                  Neuro Re-Ed                                                                                                Ther-Act                                                               Modalities

## 2022-10-04 ENCOUNTER — APPOINTMENT (OUTPATIENT)
Dept: PHYSICAL THERAPY | Facility: CLINIC | Age: 77
End: 2022-10-04

## 2022-10-06 ENCOUNTER — OFFICE VISIT (OUTPATIENT)
Dept: PHYSICAL THERAPY | Facility: CLINIC | Age: 77
End: 2022-10-06
Payer: MEDICARE

## 2022-10-06 DIAGNOSIS — I89.0 LYMPHEDEMA: Primary | ICD-10-CM

## 2022-10-06 PROCEDURE — 97140 MANUAL THERAPY 1/> REGIONS: CPT | Performed by: PHYSICAL THERAPIST

## 2022-10-06 NOTE — PROGRESS NOTES
Daily Note     Today's date: 10/6/2022  Patient name: Regina Nieves  : 1945  MRN: [id number]  Referring provider: Mariajose Holbrook MD  Dx:   Encounter Diagnosis     ICD-10-CM    1  Lymphedema  I89 0                   Subjective: Pt notes that she was able to wear her wraps for 3 days following last session and stated that her legs looks the best they have in months  Objective: See treatment diary below      Assessment: Tolerated treatment well  Pt's skin looked improved from using the foam  Pt tolerated modified MLD with no side effects  Pt brought extra straps today, applied to BL farrow wraps  Follow up on tolerance next visit  Patient would benefit from continued PT to address current limitations  Plan: Continue per plan of care        Precautions: COPD, DM2     Daily Treatment Diary:      Initial Evaluation Date: 22  Compliance 8/25  9/1  9/20  9/22  9/27  9/29  10/6         Visit Number 1 2  3  4  5  6  7         Re-Eval  IE -  -  -  -  Y  -         Foto Captured Y -  -  -  -  Y  -                8/25  9/1  9/20  9/22  9/27  9/29  10/6         Manual                      Modified MLD   40'  36'  40'  40'  40'  40'         Skin hygiene/education   5'  5'  5'  5'  5'  5'         BL compression wraps legs/feet    -  10'  10'  10'  10'  10'         Measurements      arb -      Ther-Ex                                                                                                                                                                                                                                                  Neuro Re-Ed                                                                                                Ther-Act                                                               Modalities

## 2022-10-11 ENCOUNTER — OFFICE VISIT (OUTPATIENT)
Dept: PHYSICAL THERAPY | Facility: CLINIC | Age: 77
End: 2022-10-11
Payer: MEDICARE

## 2022-10-11 DIAGNOSIS — I89.0 LYMPHEDEMA: Primary | ICD-10-CM

## 2022-10-11 PROCEDURE — 97140 MANUAL THERAPY 1/> REGIONS: CPT | Performed by: PHYSICAL THERAPIST

## 2022-10-11 NOTE — PROGRESS NOTES
Daily Note     Today's date: 10/11/2022  Patient name: Sloan Zhong  : 1945  MRN: [id number]  Referring provider: Harvey Vazquez MD  Dx:   Encounter Diagnosis     ICD-10-CM    1  Lymphedema  I89 0                   Subjective: Pt notes that the wraps continue to slide down even with the extra straps  She noted some discomfort in her ankles due to the wraps sliding down  Objective: See treatment diary below      Assessment: Tolerated treatment well  Ankles were more swollen today, likely due to the wraps  Skin fibrosis continues to be present around BL ankles, but foam is helping  Tried to bandage R ankle today to help with swelling, follow up with how she tolerated it nv  Patient would benefit from continued PT to address swelling in BL LE  Plan: Continue per plan of care        Precautions: COPD, DM2     Daily Treatment Diary:      Initial Evaluation Date: 22  Compliance 8/25  9/1  9/20  9/22  9/27  9/29  10/6  10/11       Visit Number 1 2  3  4  5  6  7  8       Re-Eval  IE -  -  -  -  Y  -  -    MC   Foto Captured Y -  -  -  -  Y  -  -              8/25  9/1  9/20  9/22  9/27  9/29  10/6  10/11       Manual                      Modified MLD   40'  40'  40'  40'  40'  40'  40'       Skin hygiene/education   5'  5'  5'  5'  5'  5'  5'       BL compression wraps legs/feet    -  10'  10'  10'  10'  10'  10'       Measurements      arb - -     Ther-Ex                                                                                                                                                                                                                                                  Neuro Re-Ed                                                                                                Ther-Act                                                               Modalities

## 2022-10-13 ENCOUNTER — OFFICE VISIT (OUTPATIENT)
Dept: PHYSICAL THERAPY | Facility: CLINIC | Age: 77
End: 2022-10-13
Payer: MEDICARE

## 2022-10-13 DIAGNOSIS — I89.0 LYMPHEDEMA: Primary | ICD-10-CM

## 2022-10-13 PROCEDURE — 97140 MANUAL THERAPY 1/> REGIONS: CPT | Performed by: PHYSICAL THERAPIST

## 2022-10-13 NOTE — PROGRESS NOTES
Daily Note     Today's date: 10/13/2022  Patient name: Shubham Garner  : 1945  MRN: [id number]  Referring provider: Yan Bello MD  Dx:   Encounter Diagnosis     ICD-10-CM    1  Lymphedema  I89 0                   Subjective: Pt notes that she kept the bandages on her R ankle/foot, but caused some discomfort overnight  Objective: See treatment diary below      Assessment: Tolerated treatment well  Visually, R ankle was down in size from the bandaging  L ankle/foot continues to be more swollen, specifically around malleoli  Discussed trying to bandage R ankle/foot next week, patient was agreeable to plan moving forward  Applied lotion and LE wraps today, pt reported on increased pain in legs following application  Patient would benefit from continued PT to address swelling in BL legs/feet  Plan: Continue per plan of care        Precautions: COPD, DM2     Daily Treatment Diary:      Initial Evaluation Date: 22  Compliance 8/25  9/1  9/20  9/22  9/27  9/29  10/6  10/11  10/13     Visit Number 1 2  3  4  5  6  7  8  9     Re-Eval  IE -  -  -  -  Y  -  -  -  MC   Foto Captured Y -  -  -  -  Y  -  -  Y            8/25  9/1  9/20  9/22  9/27  9/29  10/6  10/11  10/13     Manual                      Modified MLD   40'  40'  40'  40'  40'  40'  40'  40'     Skin hygiene/education   5'  5'  5'  5'  5'  5'  5'  5'     BL compression wraps legs/feet    -  10'  10'  10'  10'  10'  10'  10'     Measurements      arb - -     Ther-Ex                                                                                                                                                                                                                                                  Neuro Re-Ed                                                                                                Ther-Act                                                               Modalities

## 2022-10-18 ENCOUNTER — OFFICE VISIT (OUTPATIENT)
Dept: PHYSICAL THERAPY | Facility: CLINIC | Age: 77
End: 2022-10-18
Payer: MEDICARE

## 2022-10-18 DIAGNOSIS — I89.0 LYMPHEDEMA: Primary | ICD-10-CM

## 2022-10-18 PROCEDURE — 97140 MANUAL THERAPY 1/> REGIONS: CPT | Performed by: PHYSICAL THERAPIST

## 2022-10-18 NOTE — PROGRESS NOTES
Daily Note     Today's date: 10/18/2022  Patient name: Christiane John  : 1945  MRN: [id number]  Referring provider: Usman Elizalde MD  Dx:   Encounter Diagnosis     ICD-10-CM    1  Lymphedema  I89 0                   Subjective: Pt notes that she had some discomfort on the top of her ankles, but otherwise is frustrated that her wraps keep falling down  Objective: See treatment diary below      Assessment: Tolerated treatment well  Measured legs today to get proper measurements for new wraps  Added foam to both ankles today under Tubigrip to help swelling around BL ankles  Follow up with patient nv to see if she ordered new wraps  Continues to have moderate skin fibrosis around BL ankles, but is improving slightly  Patient would benefit from continued PT to address current limitations  Plan: Continue per plan of care        Precautions: COPD, DM2     Daily Treatment Diary:      Initial Evaluation Date: 22  Compliance 8/25  9/1  9/20  9/22  9/27  9/29  10/6  10/11  10/13  10/18   Visit Number 1 2  3  4  5  6  7  8  9  10   Re-Eval  IE -  -  -  -  Y  -  -  -  -   Foto Captured Y -  -  -  -  Y  -  -  Y  -          8/25  9/1  9/20  9/22  9/27  9/29  10/6  10/11  10/13  10/18   Manual                      Modified MLD   40'  40'  40'  40'  40'  40'  40'  40'  40'   Skin hygiene/education   5'  5'  5'  5'  5'  5'  5'  5' 5'   BL compression wraps legs/feet    -  10'  10'  10'  10'  10'  10'  10'  10'   Measurements      arb - -     Ther-Ex                                                                                                                                                                                                                                                  Neuro Re-Ed                                                                                                Ther-Act                                                               Modalities

## 2022-10-20 ENCOUNTER — OFFICE VISIT (OUTPATIENT)
Dept: PHYSICAL THERAPY | Facility: CLINIC | Age: 77
End: 2022-10-20
Payer: MEDICARE

## 2022-10-20 DIAGNOSIS — I89.0 LYMPHEDEMA: Primary | ICD-10-CM

## 2022-10-20 PROCEDURE — 97140 MANUAL THERAPY 1/> REGIONS: CPT | Performed by: PHYSICAL THERAPIST

## 2022-10-20 NOTE — PROGRESS NOTES
Daily Note     Today's date: 10/20/2022  Patient name: Shubham Garner  : 1945  MRN: [id number]  Referring provider: Yan Bello MD  Dx:   Encounter Diagnosis     ICD-10-CM    1  Lymphedema  I89 0                   Subjective: Pt notes that wraps continue to slide down after a few hours of wearing them  She notes that she is suppose to get the new wraps tomorrow  Objective: See treatment diary below      Assessment: Tolerated treatment well  Foam continues to help in the reduction of swelling on top of ankles and skin fibrosis  Visually, legs look reduced in size, will measure next week to track progress  No weeping or wounds present  Patient would benefit from continued PT to address lymphedema in BL LE  Plan: Continue per plan of care        Precautions: COPD, DM2     Daily Treatment Diary:      Initial Evaluation Date: 22  Compliance 10/20          10/18   Visit Number 11          10   Re-Eval  -          -   Foto Captured -          -          10/20          10/18   Manual              Modified MLD 40'          40'   Skin hygiene/education 5'         5'   BL compression wraps legs/feet  10'          10'   Measurements             Ther-Ex                                                                                                                                                          Neuro Re-Ed                                                                                Ther-Act                                                       Modalities

## 2022-10-25 ENCOUNTER — OFFICE VISIT (OUTPATIENT)
Dept: PHYSICAL THERAPY | Facility: CLINIC | Age: 77
End: 2022-10-25
Payer: MEDICARE

## 2022-10-25 DIAGNOSIS — I89.0 LYMPHEDEMA: Primary | ICD-10-CM

## 2022-10-25 PROCEDURE — 97140 MANUAL THERAPY 1/> REGIONS: CPT | Performed by: PHYSICAL THERAPIST

## 2022-10-25 NOTE — PROGRESS NOTES
Daily Note     Today's date: 10/25/2022  Patient name: Gary Duncan  : 1945  MRN: [id number]  Referring provider: Magdiel Canada MD  Dx:   Encounter Diagnosis     ICD-10-CM    1  Lymphedema  I89 0                   Subjective: Pt notes that she finally got her new wraps, brought them with her today  Objective: See treatment diary below      Assessment: Tolerated treatment well  Skin is improving with use of the foam  New wraps did go on better than previous ones, will follow up on how she tolerated them nv  Swelling around BL ankles continue to be moderate  Patient would benefit from continued PT to address swelling in BL LE  Plan: Continue per plan of care        Precautions: COPD, DM2     Daily Treatment Diary:      Initial Evaluation Date: 22  Compliance 10/20 10/25         10/18   Visit Number 11 12         10   Re-Eval  - -         -   Foto Captured - -         -          10/20 10/25         10/18   Manual              Modified MLD 40' 40'         40'   Skin hygiene/education 5' 5'        5'   BL compression wraps legs/feet  10' 10'         10'   Measurements             Ther-Ex                                                                                                                                                          Neuro Re-Ed                                                                                Ther-Act                                                       Modalities

## 2022-10-27 ENCOUNTER — OFFICE VISIT (OUTPATIENT)
Dept: PHYSICAL THERAPY | Facility: CLINIC | Age: 77
End: 2022-10-27
Payer: MEDICARE

## 2022-10-27 DIAGNOSIS — I89.0 LYMPHEDEMA: Primary | ICD-10-CM

## 2022-10-27 PROCEDURE — 97140 MANUAL THERAPY 1/> REGIONS: CPT | Performed by: PHYSICAL THERAPIST

## 2022-10-27 NOTE — PROGRESS NOTES
Daily Note     Today's date: 10/27/2022  Patient name: Antonio Urban  : 1945  MRN: [id number]  Referring provider: Jorje Omalley MD  Dx:   Encounter Diagnosis     ICD-10-CM    1  Lymphedema  I89 0                   Subjective: Pt notes that her wraps continue to fall down, but she felt that she did not have to fix them as often as her older wraps  Objective: See treatment diary below      Assessment: Tolerated treatment well  Swelling around BL ankles was improved today, likely due to light compression sock under wrap  Tried to add the extra strap to help with them falling down, follow up next visit on how it worked  Skin continues to improve, but fibrosis still present in BL ankles  Patient would benefit from continued PT to address current limitations  Plan: Continue per plan of care        Precautions: COPD, DM2     Daily Treatment Diary:      Initial Evaluation Date: 22  Compliance 10/20 10/25 10/27        10/18   Visit Number 11 12 13        10   Re-Eval  - - -        -   Foto Captured - - -        -          10/20 10/25 10/27        10/18   Manual              Modified MLD 40' 40' 40'        40'   Skin hygiene/education 5' 5' 5'       5'   BL compression wraps legs/feet  10' 10' 10'        10'   Measurements             Ther-Ex                                                                                                                                                          Neuro Re-Ed                                                                                Ther-Act                                                       Modalities

## 2022-11-01 ENCOUNTER — APPOINTMENT (OUTPATIENT)
Dept: PHYSICAL THERAPY | Facility: CLINIC | Age: 77
End: 2022-11-01

## 2022-11-03 ENCOUNTER — EVALUATION (OUTPATIENT)
Dept: PHYSICAL THERAPY | Facility: CLINIC | Age: 77
End: 2022-11-03

## 2022-11-03 DIAGNOSIS — I89.0 LYMPHEDEMA: Primary | ICD-10-CM

## 2022-11-03 NOTE — LETTER
November 3, 2022    Yen Gardner MD  [address]    Patient: Ramy Tanner   YOB: 1945   Date of Visit: 11/3/2022     Encounter Diagnosis     ICD-10-CM    1  Lymphedema  I89 0        Dear Dr Washington Artist: Thank you for your recent referral of Ramy Tanner  Please review the attached evaluation summary from [facility]'s recent visit  Please verify that you agree with the plan of care by signing the attached order  If you have any questions or concerns, please do not hesitate to call  I sincerely appreciate the opportunity to share in the care of one of your patients and hope to have another opportunity to work with you in the near future  Sincerely,    Chantale Harrell, PT      Referring Provider:      I certify that I have read the below Plan of Care and certify the need for these services furnished under this plan of treatment while under my care  Yen Gardner MD  [address] [address]  Via Fax: [phone]          Re-Evaluation     Today's date: 11/3/2022  Patient name: Ramy Tanner  : 1945  MRN: [id number]  Referring provider: Sloan Moore MD  Dx:   Encounter Diagnosis     ICD-10-CM    1  Lymphedema  I89 0                       Assessment  Assessment details:   Pt present to re-evaluation for lymphedema in BL LE  Measurements taken today show an improvement in swelling and skin continues to improve (less fibrosis) around BL ankles  She notes that she is getting a new recliner, which will continue to aid in swelling reduction if she can start using her compression pump at home  Pt reports continued frustration with wraps moving when she is wearing them, but states that the new pair does not slide down as often  Pt continues to have pain and difficulty sleeping, walking for periods of time, and going up and down the stairs   Pt would benefit from continues skilled therapy to address skin changes and swelling in BL LE  Thank you for the referral        Impairments: abnormal gait, activity intolerance, impaired balance, impaired physical strength, lacks appropriate home exercise program and pain with function  Other impairment: swelling, skin fibrosis     Symptom irritability: moderateUnderstanding of Dx/Px/POC: excellent  Goals  ST  Reduce B LE edema >1 cm girth measurement within 2 weeks  - met  2  Pt to obtain compression wrap supplies within 2 weeks  -met    LT  Reduce B LE girth measurements > 2 cm within 5 wks-met  2  Pt I in self MLD and transition to phase 2 CDT protocol within 5 wks-not met  3  Pt will be able to ambulate >75 ft  Without having to sit down to rest by DC  -not met    Plan  Patient would benefit from: skilled physical therapy  Planned therapy interventions: massage, manual therapy, patient education, functional ROM exercises, gait training, home exercise program, stretching, strengthening, therapeutic activities and therapeutic exercise  Other planned therapy interventions: MLD, compression wrapping, wound assessment, skin hygiene education   Frequency: 2x week  Duration in weeks: 4  Treatment plan discussed with: patient        Subjective Evaluation    History of Present Illness  Mechanism of injury:   Pt is getting a new recliner, so is hopeful she can start using her compression pumps again  She notes that the wraps keep falling down on her legs, but not as often as the old pair  Pt feels that her legs are not as painful as they were a few weeks ago, so is hoping that it is progress  Pt states that sleeping is still a challenge and walking for periods of time is challenging due to swelling and breathing       Quality of life: excellent    Pain  Current pain ratin  At best pain ratin  At worst pain ratin  Location: general BL leg pain  Quality: tight and discomfort  Aggravating factors: stair climbing, walking and standing    Social Support  Steps to enter house: yes (garage)  Stairs in house: no   Lives in: [facility]  Lives with: alone    Employment status: not working    Diagnostic Tests  No diagnostic tests performed  Treatments  Previous treatment: physical therapy (Did have lymphedema therapy >4 years ago)  Patient Goals  Patient goals for therapy: decreased edema, decreased pain, increased strength and independence with ADLs/IADLs  Patient goal: "I want the swelling to go down in my legs "        Objective  LOWER EXTREMITY LEFT CALCULATIONS    Flowsheet Row Most Recent Value   Volume LE (mL) 6493   Difference from last visit (mL)  -226   Difference from first visit (mL)  663   Difference from last visit (%)  -4   Difference from first visit (%)  9      LOWER EXTREMITY RIGHT CALCULATIONS    Flowsheet Row Most Recent Value   Volume LE (mL) 5976   Difference from last visit (mL)  315   Difference from first visit (mL)  512   Difference from last visit (%)  5   Difference from first visit (%)  8                Physical assessment: B/L swelling L>R, from below knee to toes  Minimal-moderate skin fibrosis and skin breakdown (dryness/ hyperkeratosis) around BL ankles, has improved since last re-eval   Pt reports weeping in the anterior aspect of BL shins, none currently at IE  Palpation: minimal-moderate heaviness B lower legs, worse on L   Skin Mobility: moderate B lower legs  Skin color: increased redness in B lower legs  Pittin+  Wound presence: n/a  Stemmer's Sign: +     Transfer status: I but takes increased time- uses cane to ambulate   Ability to don/doff clothing/garments: I but states that it takes her increased time and is difficult   Pt able to don/doff stockings, not able to put socks on I  Toe nail hygiene: fair but does get them professionally cut(foot doctor)       Precautions: COPD, DM2     Daily Treatment Diary:      Initial Evaluation Date: 22  Compliance 10/20 10/25 10/27 11/3       10/18   Visit Number [id number]       82 Re-Eval  - - - Y       -   Foto Captured - - - Y       -          10/20 10/25 10/27 11/3       10/18   Manual              Modified MLD 40' 36' 36' 40'       40'   Skin hygiene/education 5' 5' 5' 5'      5'   BL compression wraps legs/feet  10' 10' 10' 10'       10'   Measurements             Ther-Ex                                                                                                                                                          Neuro Re-Ed                                                                                Ther-Act                                                       Modalities

## 2022-11-03 NOTE — PROGRESS NOTES
Re-Evaluation     Today's date: 11/3/2022  Patient name: Elise Wiley  : 1945  MRN: [id number]  Referring provider: Connor Thomas MD  Dx:   Encounter Diagnosis     ICD-10-CM    1  Lymphedema  I89 0                       Assessment  Assessment details:   Pt present to re-evaluation for lymphedema in BL LE  Measurements taken today show an improvement in swelling and skin continues to improve (less fibrosis) around BL ankles  She notes that she is getting a new recliner, which will continue to aid in swelling reduction if she can start using her compression pump at home  Pt reports continued frustration with wraps moving when she is wearing them, but states that the new pair does not slide down as often  Pt continues to have pain and difficulty sleeping, walking for periods of time, and going up and down the stairs  Pt would benefit from continues skilled therapy to address skin changes and swelling in BL LE  Thank you for the referral        Impairments: abnormal gait, activity intolerance, impaired balance, impaired physical strength, lacks appropriate home exercise program and pain with function  Other impairment: swelling, skin fibrosis     Symptom irritability: moderateUnderstanding of Dx/Px/POC: excellent  Goals  ST  Reduce B LE edema >1 cm girth measurement within 2 weeks  - met  2  Pt to obtain compression wrap supplies within 2 weeks  -met    LT  Reduce B LE girth measurements > 2 cm within 5 wks-met  2  Pt I in self MLD and transition to phase 2 CDT protocol within 5 wks-not met  3  Pt will be able to ambulate >75 ft   Without having to sit down to rest by DC  -not met    Plan  Patient would benefit from: skilled physical therapy  Planned therapy interventions: massage, manual therapy, patient education, functional ROM exercises, gait training, home exercise program, stretching, strengthening, therapeutic activities and therapeutic exercise  Other planned therapy interventions: MLD, compression wrapping, wound assessment, skin hygiene education   Frequency: 2x week  Duration in weeks: 4  Treatment plan discussed with: patient        Subjective Evaluation    History of Present Illness  Mechanism of injury:   Pt is getting a new recliner, so is hopeful she can start using her compression pumps again  She notes that the wraps keep falling down on her legs, but not as often as the old pair  Pt feels that her legs are not as painful as they were a few weeks ago, so is hoping that it is progress  Pt states that sleeping is still a challenge and walking for periods of time is challenging due to swelling and breathing       Quality of life: excellent    Pain  Current pain ratin  At best pain ratin  At worst pain ratin  Location: general BL leg pain  Quality: tight and discomfort  Aggravating factors: stair climbing, walking and standing    Social Support  Steps to enter house: yes (garage)  Stairs in house: no   Lives in: [facility]  Lives with: alone    Employment status: not working    Diagnostic Tests  No diagnostic tests performed  Treatments  Previous treatment: physical therapy (Did have lymphedema therapy >4 years ago)  Patient Goals  Patient goals for therapy: decreased edema, decreased pain, increased strength and independence with ADLs/IADLs  Patient goal: "I want the swelling to go down in my legs "        Objective  LOWER EXTREMITY LEFT CALCULATIONS    Flowsheet Row Most Recent Value   Volume LE (mL) 6493   Difference from last visit (mL)  -226   Difference from first visit (mL)  663   Difference from last visit (%)  -4   Difference from first visit (%)  9      LOWER EXTREMITY RIGHT CALCULATIONS    Flowsheet Row Most Recent Value   Volume LE (mL) 5976   Difference from last visit (mL)  315   Difference from first visit (mL)  512   Difference from last visit (%)  5   Difference from first visit (%)  8                Physical assessment: B/L swelling L>R, from below knee to toes  Minimal-moderate skin fibrosis and skin breakdown (dryness/ hyperkeratosis) around BL ankles, has improved since last re-eval   Pt reports weeping in the anterior aspect of BL shins, none currently at IE  Palpation: minimal-moderate heaviness B lower legs, worse on L   Skin Mobility: moderate B lower legs  Skin color: increased redness in B lower legs  Pittin+  Wound presence: n/a  Stemmer's Sign: +     Transfer status: I but takes increased time- uses cane to ambulate   Ability to don/doff clothing/garments: I but states that it takes her increased time and is difficult   Pt able to don/doff stockings, not able to put socks on I  Toe nail hygiene: fair but does get them professionally cut(foot doctor)       Precautions: COPD, DM2     Daily Treatment Diary:      Initial Evaluation Date: 22  Compliance 10/20 10/25 10/27 11/3       10/18   Visit Number 11 12 13 14       10   Re-Eval  - - - Y       -   Foto Captured - - - Y       -          10/20 10/25 10/27 11/3       10/18   Manual              Modified MLD 40' 36' 40' 40'       40'   Skin hygiene/education 5' 5' 5' 5'      5'   BL compression wraps legs/feet  10' 10' 10' 10'       10'   Measurements             Ther-Ex                                                                                                                                                          Neuro Re-Ed                                                                                Ther-Act                                                       Modalities

## 2022-11-08 ENCOUNTER — APPOINTMENT (OUTPATIENT)
Dept: PHYSICAL THERAPY | Facility: CLINIC | Age: 77
End: 2022-11-08

## 2022-11-10 ENCOUNTER — OFFICE VISIT (OUTPATIENT)
Dept: PHYSICAL THERAPY | Facility: CLINIC | Age: 77
End: 2022-11-10

## 2022-11-10 DIAGNOSIS — I89.0 LYMPHEDEMA: Primary | ICD-10-CM

## 2022-11-10 NOTE — PROGRESS NOTES
Daily Note     Today's date: 11/10/2022  Patient name: Jared Ochoa  : 1945  MRN: [id number]  Referring provider: Jan Powell MD  Dx:   Encounter Diagnosis     ICD-10-CM    1  Lymphedema  I89 0                   Subjective: Pt notes that the wraps are still falling down, but not at often  Objective: See treatment diary below      Assessment: Tolerated treatment well  Dryness and skin fibrosis was visually less today  Foam has been helping around BL ankles  Pt's new wraps are helping with the swelling in BL lower legs, will measure in 2 weeks  Patient would benefit from continued PT to address swelling and skin changes in BL LE  Plan: Continue per plan of care        Precautions: COPD, DM2     Daily Treatment Diary:      Initial Evaluation Date: 22  Compliance 10/20 10/25 10/27 11/3 11/10      10/18   Visit Number 11 12 13 14 15      10   Re-Eval  - - - Y -      -   Foto Captured - - - Y -      -          10/20 10/25 10/27 11/3 11/10      10/18   Manual              Modified MLD 36' 36' 40' 40' 40'      40'   Skin hygiene/education 5' 5' 5' 5' 5'     5'   BL compression wraps legs/feet  10' 10' 10' 10' 10'      10'   Measurements             Ther-Ex                                                                                                                                                          Neuro Re-Ed                                                                                Ther-Act                                                       Modalities

## 2022-11-15 ENCOUNTER — APPOINTMENT (OUTPATIENT)
Dept: PHYSICAL THERAPY | Facility: CLINIC | Age: 77
End: 2022-11-15

## 2022-11-17 ENCOUNTER — APPOINTMENT (OUTPATIENT)
Dept: PHYSICAL THERAPY | Facility: CLINIC | Age: 77
End: 2022-11-17

## 2022-11-17 ENCOUNTER — OFFICE VISIT (OUTPATIENT)
Dept: PHYSICAL THERAPY | Facility: CLINIC | Age: 77
End: 2022-11-17

## 2022-11-17 DIAGNOSIS — I89.0 LYMPHEDEMA: Primary | ICD-10-CM

## 2022-11-17 NOTE — PROGRESS NOTES
Daily Note     Today's date: 2022  Patient name: Satish Cade  : 1945  MRN: [id number]  Referring provider: Fitz Miranda MD  Dx:   Encounter Diagnosis     ICD-10-CM    1  Lymphedema  I89 0                      Subjective: Pt notes that she put more Velcro on the wraps and it has been helping  Objective: See treatment diary below      Assessment: Tolerated treatment well  Pt's skin continues to improve with daily lotion and compression  Swelling continues to improve, but moderate swelling around BL ankles  Patient would benefit from continued PT to address current limitations  Plan: Continue per plan of care        Precautions: COPD, DM2     Daily Treatment Diary:      Initial Evaluation Date: 22  Compliance 10/20 10/25 10/27 11/3 11/10 11/17     10/18   Visit Number [id number] 99 97 11 16     10   Re-Eval  - - - Y - -     -   Foto Captured - - - Y - -     -          10/20 10/25 10/27 11/3 11/10 11/17     10/18   Manual              Modified MLD 36' 36' 40' 40' 40' 40'     40'   Skin hygiene/education 5' 5' 5' 5' 5' 5'    5'   BL compression wraps legs/feet  10' 10' 10' 10' 10' 10'     10'   Measurements             Ther-Ex                                                                                                                                                          Neuro Re-Ed                                                                                Ther-Act                                                       Modalities

## 2022-11-21 ENCOUNTER — OFFICE VISIT (OUTPATIENT)
Dept: PHYSICAL THERAPY | Facility: CLINIC | Age: 77
End: 2022-11-21

## 2022-11-21 DIAGNOSIS — I89.0 LYMPHEDEMA: Primary | ICD-10-CM

## 2022-11-21 NOTE — PROGRESS NOTES
Daily Note     Today's date: 2022  Patient name: Dheeraj Saldivar  : 1945  MRN: [id number]  Referring provider: Rajani Amato MD  Dx:   Encounter Diagnosis     ICD-10-CM    1  Lymphedema  I89 0                      Subjective: Pt notes that she has been still having to redo wraps throughout the day, but has been a little better  Objective: See treatment diary below      Assessment: Tolerated treatment well  Skin fibrosis continues to improve on BL ankles, still worse on her L side  Skin mobility continues to improve, but swelling is consistent around BL ankles likely because she does not have consistent compression on that area  No weeping currently from either LE  Hopeful that she can get a new recliner soon so that she can start using her compression pumps at home  Patient would benefit from continued PT to address current limitations  Plan: Continue per plan of care        Precautions: COPD, DM2     Daily Treatment Diary:      Initial Evaluation Date: 22  Compliance 10/20 10/25 10/27 11/3 11/10 11/17 11/21    10/18   Visit Number [id number] 75 02 64 80    10   Re-Eval  - - - Y - - -    -   Foto Captured - - - Y - - -    -          10/20 10/25 10/27 11/3 11/10 11/17 11/17    10/18   Manual              Modified MLD 36' 36' 40' 40' 40' 40' 40'    40'   Skin hygiene/education 5' 5' 5' 5' 5' 5' 5'   5'   BL compression wraps legs/feet  10' 10' 10' 10' 10' 10' 10'    10'   Measurements             Ther-Ex                                                                                                                                                          Neuro Re-Ed                                                                                Ther-Act                                                       Modalities

## 2022-11-22 ENCOUNTER — APPOINTMENT (OUTPATIENT)
Dept: PHYSICAL THERAPY | Facility: CLINIC | Age: 77
End: 2022-11-22

## 2022-11-23 ENCOUNTER — OFFICE VISIT (OUTPATIENT)
Dept: PHYSICAL THERAPY | Facility: CLINIC | Age: 77
End: 2022-11-23

## 2022-11-23 DIAGNOSIS — I89.0 LYMPHEDEMA: Primary | ICD-10-CM

## 2022-11-23 NOTE — PROGRESS NOTES
Daily Note     Today's date: 2022  Patient name: Ihsan Aldana  : 1945  MRN: [id number]  Referring provider: Maddie Puente MD  Dx:   Encounter Diagnosis     ICD-10-CM    1  Lymphedema  I89 0                      Subjective: Pt notes that she is feeling about the same  Objective: See treatment diary below      Assessment: Tolerated treatment well  Swelling around BL ankles were improved slightly today  She continues to have some skin fibrosis around BL ankles, worse on L side  Pt has not been using the compression pumps at home, is hopeful to start next week  Patient would benefit from continued PT to address current limitations  Plan: Continue per plan of care        Precautions: COPD, DM2     Daily Treatment Diary:      Initial Evaluation Date: 22  Compliance 10/20 10/25 10/27 11/3 11/10 11/17 11/21 11/23   10/18   Visit Number [id number] 30 73 25 54 18   10   Re-Eval  - - - Y - - - -   -   Foto Captured - - - Y - - - -   -          10/20 10/25 10/27 11/3 11/10 11/17 11/17 11/23   10/18   Manual              Modified MLD 36' 36' 40' 40' 40' 40' 40' 40'   40'   Skin hygiene/education 5' 5' 5' 5' 5' 5' 5' 5'  5'   BL compression wraps legs/feet  10' 10' 10' 10' 10' 10' 10' 10'   10'   Measurements             Ther-Ex                                                                                                                                                          Neuro Re-Ed                                                                                Ther-Act                                                       Modalities

## 2022-11-29 ENCOUNTER — OFFICE VISIT (OUTPATIENT)
Dept: PHYSICAL THERAPY | Facility: CLINIC | Age: 77
End: 2022-11-29

## 2022-11-29 DIAGNOSIS — I89.0 LYMPHEDEMA: Primary | ICD-10-CM

## 2022-11-29 NOTE — PROGRESS NOTES
Daily Note     Today's date: 2022  Patient name: Skye Arora  : 1945  MRN: [id number]  Referring provider: Lul Flores MD  Dx:   Encounter Diagnosis     ICD-10-CM    1  Lymphedema  I89 0                      Subjective: Pt notes that she had to sit at the car [employer] for awhile yesterday, so her knees were really bothering her today  Objective: See treatment diary below      Assessment: Tolerated treatment well  Pt's skin fibrosis continues to look improved with lotion and daily compression  She has not been using her at home compression, is hopeful that she will start this week  Swelling around BL ankles continue, but should improve with use of at home pumps along with MLD 2x a week  Pt notes that her wraps are still falling down, but not as often  Patient would benefit from continued PT to address swelling in BL LE  Plan: Continue per plan of care        Precautions: COPD, DM2     Daily Treatment Diary:      Initial Evaluation Date: 22  Compliance 10/20 10/25 10/27 11/3 11/10 11/17 11/21 11/23 11/29  10/18   Visit Number [id number] 18 66 24 18 19  10   Re-Eval  - - - Y - - - - -  -   Foto Captured - - - Y - - - - -  -          10/20 10/25 10/27 11/3 11/10 11/17 11/17 11/23 11/29  10/18   Manual              Modified MLD 36' 36' 40' 40' 40' 40' 40' 40' 40'  40'   Skin hygiene/education 5' 5' 5' 5' 5' 5' 5' 5' 5' 5'   BL compression wraps legs/feet  10' 10' 10' 10' 10' 10' 10' 10' 10'  10'   Measurements             Ther-Ex                                                                                                                                                          Neuro Re-Ed                                                                                Ther-Act                                                       Modalities

## 2022-12-01 ENCOUNTER — APPOINTMENT (OUTPATIENT)
Dept: PHYSICAL THERAPY | Facility: CLINIC | Age: 77
End: 2022-12-01

## 2022-12-06 ENCOUNTER — APPOINTMENT (OUTPATIENT)
Dept: PHYSICAL THERAPY | Facility: CLINIC | Age: 77
End: 2022-12-06

## 2022-12-08 ENCOUNTER — OFFICE VISIT (OUTPATIENT)
Dept: PHYSICAL THERAPY | Facility: CLINIC | Age: 77
End: 2022-12-08

## 2022-12-08 DIAGNOSIS — I89.0 LYMPHEDEMA: Primary | ICD-10-CM

## 2022-12-08 NOTE — PROGRESS NOTES
Re-Evaluation/ DC Summary     Today's date: 2022  Patient name: Hermann Temple  : 1945  MRN: [id number]  Referring provider: Janessa Rios MD  Dx:   Encounter Diagnosis     ICD-10-CM    1  Lymphedema  I89 0                    Assessment  Assessment details:   Hermann Temple has been compliant with attending PT and HEP since initial eval  Hemrann Temple has made improvements in objective data since IE and has maximized function at this time  Pt notes that she will be compliant with wearing her wraps and using her compression pumps to help keep her swelling down in her legs  Skin fibrosis continues to be present, but has improve immensely since starting PT  Patient is agreeable to d/c to HEP at this time, and will contact clinic with any exercise related questions or concerns as needed  Impairments: abnormal gait, activity intolerance, impaired balance, impaired physical strength, lacks appropriate home exercise program and pain with function  Other impairment: swelling, skin fibrosis     Symptom irritability: moderateUnderstanding of Dx/Px/POC: excellent  Goals  ST  Reduce B LE edema >1 cm girth measurement within 2 weeks  - met  2  Pt to obtain compression wrap supplies within 2 weeks  -met    LT  Reduce B LE girth measurements > 2 cm within 5 wks-met  2  Pt I in self MLD and transition to phase 2 CDT protocol within 5 wks-met  3  Pt will be able to ambulate >75 ft  Without having to sit down to rest by DC  -not met- her breathing continues to be her limiting factor    Plan  Patient to be DC'd following today's session  She will continue wear her compression wraps daily and use her compression pumps at home  Subjective Evaluation    History of Present Illness  Mechanism of injury:   Pt notes that she continues to wear her compression wraps everyday and has been able to get in her compression pumps at home more often   States that she does continue to get some pain in her R lower leg, but is less frequent  Her wraps are still sliding down, but has been better since new wraps  Pt states that she is happy with her progress thus far           Quality of life: excellent    Pain  Current pain ratin  At best pain ratin  At worst pain ratin  Location: general BL leg pain  Quality: tight and discomfort  Aggravating factors: stair climbing, walking and standing    Social Support  Steps to enter house: yes (garage)  Stairs in house: no   Lives in: one-story house  Lives with: alone    Employment status: not working    Diagnostic Tests  No diagnostic tests performed  Treatments  Previous treatment: physical therapy (Did have lymphedema therapy >4 years ago)  Patient Goals  Patient goals for therapy: decreased edema, decreased pain, increased strength and independence with ADLs/IADLs  Patient goal: "I want the swelling to go down in my legs "        Objective  LOWER EXTREMITY LEFT CALCULATIONS    Flowsheet Row Most Recent Value   Volume LE (mL) 5431   Difference from last visit (mL)  1062   Difference from first visit (mL)  1725   Difference from last visit (%)  16   Difference from first visit (%)  24      LOWER EXTREMITY RIGHT CALCULATIONS    Flowsheet Row Most Recent Value   Volume LE (mL) 5186   Difference from last visit (mL)  790   Difference from first visit (mL)  1302   Difference from last visit (%)  13   Difference from first visit (%)  20               Precautions: COPD, DM2     Daily Treatment Diary:      Initial Evaluation Date: 22  Compliance 10/20 10/25 10/27 11/3 11/10 11/17 11/21 11/23 11/29 12/8   Visit Number 11 12 13 14 15 16 17 18 19 20   Re-Eval  - - - Y - - - - - Y   Foto Captured - - - Y - - - - - -          10/20 10/25 10/27 11/3 11/10 11/17 11/17 11/23 11/29  12/8   Manual              Modified MLD 36' 36' 40' 40' 40' 40' 40' 40' 40'  40'   Skin hygiene/education 5' 5' 5' 5' 5' 5' 5' 5' 5' 5'   BL compression wraps legs/feet  10' 10' 10' 10' 10' 10' 10' 10' 10'  10'   Measurements          Arb    Ther-Ex                                                                                                                                                          Neuro Re-Ed                                                                                Ther-Act                                                       Modalities

## 2022-12-08 NOTE — LETTER
December 15, 2022    Daniel Choi MD  [address]    Patient: Edu Clarke   YOB: 1945   Date of Visit: 2022     Encounter Diagnosis     ICD-10-CM    1  Lymphedema  I89 0           Dear Dr Jonh Estrella: Thank you for your recent referral of Edu Clarke  Please review the attached evaluation summary from Shannan's recent visit  Please verify that you agree with the plan of care by signing the attached order  If you have any questions or concerns, please do not hesitate to call  I sincerely appreciate the opportunity to share in the care of one of your patients and hope to have another opportunity to work with you in the near future  Sincerely,    Cecily Patricia, PT      Referring Provider:      I certify that I have read the below Plan of Care and certify the need for these services furnished under this plan of treatment while under my care  Daniel Choi MD  [address] [id number]  Via Fax: [phone]          Re-Evaluation/ DC Summary     Today's date: 2022  Patient name: Edu Clarke  : 1945  MRN: [id number]  Referring provider: Emma Richardson MD  Dx:   Encounter Diagnosis     ICD-10-CM    1  Lymphedema  I89 0                    Assessment  Assessment details:   Edu Clarke has been compliant with attending PT and HEP since initial eval  Edu Clarke has made improvements in objective data since IE and has maximized function at this time  Pt notes that she will be compliant with wearing her wraps and using her compression pumps to help keep her swelling down in her legs  Skin fibrosis continues to be present, but has improve immensely since starting PT  Patient is agreeable to d/c to HEP at this time, and will contact clinic with any exercise related questions or concerns as needed        Impairments: abnormal gait, activity intolerance, impaired balance, impaired physical strength, lacks appropriate home exercise program and pain with function  Other impairment: swelling, skin fibrosis     Symptom irritability: moderateUnderstanding of Dx/Px/POC: excellent  Goals  ST  Reduce B LE edema >1 cm girth measurement within 2 weeks  - met  2  Pt to obtain compression wrap supplies within 2 weeks  -met    LT  Reduce B LE girth measurements > 2 cm within 5 wks-met  2  Pt I in self MLD and transition to phase 2 CDT protocol within 5 wks-met  3  Pt will be able to ambulate >75 ft  Without having to sit down to rest by DC  -not met- her breathing continues to be her limiting factor    Plan  Patient to be DC'd following today's session  She will continue wear her compression wraps daily and use her compression pumps at home  Subjective Evaluation    History of Present Illness  Mechanism of injury:   Pt notes that she continues to wear her compression wraps everyday and has been able to get in her compression pumps at home more often  States that she does continue to get some pain in her R lower leg, but is less frequent  Her wraps are still sliding down, but has been better since new wraps  Pt states that she is happy with her progress thus far           Quality of life: excellent    Pain  Current pain ratin  At best pain ratin  At worst pain ratin  Location: general BL leg pain  Quality: tight and discomfort  Aggravating factors: stair climbing, walking and standing    Social Support  Steps to enter house: yes (garage)  Stairs in house: no   Lives in: [facility]  Lives with: alone    Employment status: not working    Diagnostic Tests  No diagnostic tests performed  Treatments  Previous treatment: physical therapy (Did have lymphedema therapy >4 years ago)  Patient Goals  Patient goals for therapy: decreased edema, decreased pain, increased strength and independence with ADLs/IADLs  Patient goal: "I want the swelling to go down in my legs "        Objective  LOWER EXTREMITY LEFT CALCULATIONS    Flowsheet Row Most Recent Value   Volume LE (mL) 5431   Difference from last visit (mL)  1062   Difference from first visit (mL)  1725   Difference from last visit (%)  16   Difference from first visit (%)  24      LOWER EXTREMITY RIGHT CALCULATIONS    Flowsheet Row Most Recent Value   Volume LE (mL) 5186   Difference from last visit (mL)  790   Difference from first visit (mL)  1302   Difference from last visit (%)  13   Difference from first visit (%)  20              Precautions: COPD, DM2     Daily Treatment Diary:      Initial Evaluation Date: 08/25/22  Compliance 10/20 10/25 10/27 11/3 11/10 11/17 11/21 11/23 11/29 12/8   Visit Number 05 65 15 39 19 51 43 81 48 20   Re-Eval  - - - Y - - - - - Y   Foto Captured - - - Y - - - - - -          10/20 10/25 10/27 11/3 11/10 11/17 11/17 11/23 11/29  12/8   Manual              Modified MLD 40' 36' 40' 40' 40' 40' 40' 40' 40'  40'   Skin hygiene/education 5' 5' 5' 5' 5' 5' 5' 5' 5' 5'   BL compression wraps legs/feet  10' 10' 10' 10' 10' 10' 10' 10' 10'  10'   Measurements          Arb    Ther-Ex                                                                                                                                                          Neuro Re-Ed                                                                                Ther-Act                                                       Modalities

## 2022-12-13 ENCOUNTER — APPOINTMENT (OUTPATIENT)
Dept: PHYSICAL THERAPY | Facility: CLINIC | Age: 77
End: 2022-12-13

## 2022-12-15 ENCOUNTER — APPOINTMENT (OUTPATIENT)
Dept: PHYSICAL THERAPY | Facility: CLINIC | Age: 77
End: 2022-12-15

## 2025-01-29 NOTE — RESPIRATORY THERAPY NOTE
Responded to overhead page for rapid response    Patient placed on bipap and given an abuterol treatment via aerogen per verbal order from ROSE[facility] Xray Chest 2 Views PA/Lat

## 2025-06-11 NOTE — ASSESSMENT & PLAN NOTE
Morbid obesity due to excess calories with BMI of 51 24  Encourage weight loss  Dietary changes    Speech/swallow eval as patient presents concern for aspiration 18